# Patient Record
Sex: FEMALE | Race: WHITE | Employment: FULL TIME | URBAN - METROPOLITAN AREA
[De-identification: names, ages, dates, MRNs, and addresses within clinical notes are randomized per-mention and may not be internally consistent; named-entity substitution may affect disease eponyms.]

---

## 2018-07-05 ENCOUNTER — HOSPITAL ENCOUNTER (OUTPATIENT)
Dept: RADIATION THERAPY | Age: 53
End: 2018-07-05

## 2018-07-19 ENCOUNTER — HOSPITAL ENCOUNTER (OUTPATIENT)
Dept: RADIATION THERAPY | Age: 53
Discharge: HOME | End: 2018-07-19
Payer: COMMERCIAL

## 2018-07-19 PROCEDURE — 99211 OFF/OP EST MAY X REQ PHY/QHP: CPT

## 2018-08-30 ENCOUNTER — HOSPITAL ENCOUNTER (OUTPATIENT)
Dept: MAMMOGRAPHY | Age: 53
Discharge: HOME | End: 2018-08-30
Attending: RADIOLOGY
Payer: COMMERCIAL

## 2018-08-30 DIAGNOSIS — C50.412 MALIGNANT NEOPLASM OF UPPER-OUTER QUADRANT OF LEFT FEMALE BREAST (HCC): ICD-10-CM

## 2018-08-30 PROCEDURE — 77061 BREAST TOMOSYNTHESIS UNI: CPT

## 2021-07-13 NOTE — H&P (VIEW-ONLY)
Breast Cancer Consult      Ms. Mandy Brannon is a 60year old woman who was referred for recurrent left ER/MA positive, HER negative breast cancer, s/p core biopsy 6/9/21 with a personal history of left Stage IIA ([id number]) ER/MA positive, HER negative breast cancer s/p partial mastectomy 3/23/18 by Dr. Leticia Tan. Initial cancer Oncotype was 17. Patient declined radiation. She has been taking Femara since 12/18 per Dr. Maribell Roblero. The area of concern was identified on diagnostic imaging obtained for breast pain. She denied palpable mass. She denied nipple discharge. There is family history of breast cancer in her mother and grandmother and maternal cousin. She reports she has not had genetic testing. She is of Ashkenazi descent. Her most recent previous mammogram was 1/14/21. Breast/GYN history:    OB History    No obstetric history on file. Past Medical History:   Diagnosis Date    [employer] ancestry requiring population-specific genetic screening     Breast cancer [facility]     Left breast    Grand mal seizure ([facility] [address] [facility]     Seizures ([facility] [address] [facility]        Past Surgical History:   Procedure Laterality Date    HX BREAST LUMPECTOMY      Left breast       Current Outpatient Medications on File Prior to Visit   Medication Sig Dispense Refill    ergocalciferol (ERGOCALCIFEROL) 1,250 mcg (50,000 unit) capsule Take 50,000 Units by mouth.  letrozole (FEMARA) 2.5 mg tablet Take 2.5 mg by mouth daily.  phenytoin ER (DILANTIN ER) 100 mg ER capsule Take 200 mg by mouth two (2) times a day. No current facility-administered medications on file prior to visit.        Allergies   Allergen Reactions    Ampicillin Hives    Azithromycin Hives    Cephalexin Hives    Ciprofloxacin Hives    Sulfamethoxazole-Trimethoprim Other (comments) and Itching       Social History     Tobacco Use    Smoking status: Never Smoker    Smokeless tobacco: Never Used   Substance Use Topics    Alcohol use: Not on file    Drug use: Not on file       Family History   Problem Relation Age of Onset    Breast Cancer Mother 58    Breast Cancer Maternal Grandmother     Breast Cancer Maternal Cousin          ROS: positives are bolded  General: fevers, chills, night sweats, fatigue, weight loss, weight gain  GI: nausea, vomiting, abdominal pain, change in bowel habits, hematochezia, melena, GERD  Integ: dermatitis, abnormal moles  HEENT: visual changes, vertigo, epistaxis, dysphagia, hoarseness  Cardiac: chest pain, palpitations, HTN, edema, varicosities  Resp: cough, shortness of breath, wheezing, hemoptysis, snoring, reactive airway disease  : hematuria, dysuria, frequency, urgency, nocturia, stress urinary incontinence   MSK: weakness, joint pain, arthritis  Endocrine:  thyroid disease, polyuria, polydipsia, polyphagia, poor wound healing, heat intolerance, cold intolerance  Lymph/Heme: anemia, bruising, history of blood transfusions  Neuro: dizziness, headache, fainting, seizures, stroke  Psych: anxiety, depression    Physical Exam:  Visit Vitals  /82   Temp 97.2 °F (36.2 °C) (Skin)   Resp 18   Ht 5' 1\" (1.549 m)   Wt 76.2 kg (168 lb)   BMI 31.74 kg/m²       Gen:  No distress  Head: normocephalic, atraumatic  Mouth: Clear, no overt lesions, oral mucosa pink and moist  Neck: supple, no masses, no adenopathy, trachea midline  Resp: clear bilaterally  Cardio: Regular rate and rhythm  Abdomen: soft, nontender, nondistended  Extremities: warm, well-perfused  Neuro: sensation and strength grossly intact and symmetrical  Psych: alert and oriented to person, place and time  Breasts:   Right: Examined in both the supine and upright positions. There was no supraclavicular, infraclavicular, or axillary lymphadenopathy. There were no dominant masses, no skin changes, no asymmetry identified   Left: Examined in both the supine and upright positions. There was no supraclavicular, infraclavicular, or axillary lymphadenopathy.    There were no dominant masses, no skin changes, no asymmetry identified scar upper outer with nodularity ?hematoma vs mass vs scar tissue      Imagin21 left mammogram/ultrasound [facility])  Left 2:00 breast mass. Recommend ultrasound-guided core needle biopsy for further evaluation. Exam results were discussed with the patient. The referring clinician's office will be notified regarding the recommendations for left breast biopsy. Assessment Category 4: Suspicious     Signed By: Malick Fitch MD on 2021 2:31 PM    21 bilateral mammogram [facility])  No mammographic evidence of malignancy. Recommend annual screening mammography and clinical breast examination. A result letter will be sent to the patient. The patient will be notified by the [facility] reminder system for scheduling of her annual screening mammogram.     Assessment Category 2: Benign     Signed By: Candelaria Anaya MD on 2021 5:03 PM    Pathology:  21  A) LEFT BREAST MASS, CORE BIOPSY:       - INVASIVE CARCINOMA OF NOT SPECIAL TYPE (DUCTAL, NOS). - HISTOLOGIC stGstRstAstDstEst:st st1st OF 3.       - LARGEST FOCUS IN BIOPSY:  1.5 MM (MULTIPLE FRAGMENTS POSITIVE). - ANCILLARY STUDIES:          - ESTROGEN RECEPTOR: POSITIVE (90% CELS WITH MODERATE NUCLEAR STAINING)         - PROGESTERONE RECEPTOR: POSITIVE (10% CELLS WITH MODERATE NUCLEAR STAINING)         - HER-2: EQUIVOCAL (2+). FISH TEST RESULT TO FOLLOW IN A SEPARATE REPORT       Impression:  Patient Active Problem List   Diagnosis Code    Malignant neoplasm of upper-outer quadrant of left breast in female, estrogen receptor positive [facility] C50.412, Z17.0         60year old woman who was referred for recurrent left ER/CA positive, HER negative breast cancer, s/p core biopsy 21 with a personal history of left Stage IIA ([id number]) ER/CA positive, HER negative breast cancer s/p partial mastectomy 3/23/18 by Dr. Marisol Blanca.         We discussed that there are many options for treatment of breast cancer. Surgery, chemotherapy, radiation and hormone therapy are all tools that may be used in the treatment of breast cancer. For each patient, we determine which will be most beneficial based on her individual set of circumstances. For some patients all four treatment categories will be recommended. For others one or more of these options are not appropriate. We began by reviewing her imaging thus far as well as pathology in detail. Chemotherapy was addressed first.  Chemotherapy is systemic treatment aimed largely to decrease chance of spread or recurrence of cancer. It is administered by a medical oncologist.  Often the decision for chemotherapy is made after final pathology. I have recommended referral to medical oncology for additional information regarding chemotherapy. Regarding surgery, there are two main options, lumpectomy or mastectomy. We discussed both in detail. Overall survival and distant recurrence rates are the same. The decision is generally a personal decision more so than a medical one. Lumpectomy is also known as breast conservation surgery or partial mastectomy. The goal is to remove the area of concern as well as surrounding area of uninvolved tissue (\"clear margins\"). Radiation is almost always recommended with lumpectomy to allow for acceptable local recurrence rates. Local recurrence rates are approximately 6% after lumpectomy with radiation. Risks, benefits and options were discussed in detail to include, but not limited to, bleeding, infection, risks of anesthesia, injury to surrounding structures and other unforeseen events such as stroke, heart attack or death. Mastectomy was then addressed. With mastectomy, almost all of the breast tissue is removed. We are not able to remove 100% of the breast tissue. The risk of local recurrence is approximately 2-4% after mastectomy. The overlying skin is generally numb.   Most often, the numbness is permanent. Mastectomy can be performed with or without reconstruction. The reconstruction is performed by the plastic surgeon. Commonly it is a multi-step process with placement of tissue expanders as the first step. If she is interested in reconstruction, I will refer her to plastic surgery. Often we are able to perform a nipple sparing mastectomy with reconstruction. The reconstructed breast differs in many ways from the native breast.  The goal is that in a bra or clothing, no one can tell she has had a mastectomy. Radiation generally is not needed after mastectomy. There are some circumstances, usually based on size, margins, local extension or lymph node status, where post-mastectomy radiation is recommended. Risks, benefits and options were discussed in detail to include, but not limited to, bleeding, infection, risks of anesthesia, injury to surrounding structures and other unforeseen events such as stroke, heart attack or death. Routine screening mammogram is not recommended after mastectomy. As she is clinically node negative, she is a candidate for sentinel node biopsy. We discussed this procedure is a targeted sampling of the axillary lymph nodes to allow for staging of her disease. She will be injected with radioactive isotope as well as blue dye to allow for mapping and removal of the sentinel nodes. By removing only the sentinel nodes and not performing axillary node dissection, she has a decreased risk of lymphedema (arm swelling) and nerve injury. We did specifically discuss that both of these risks still exist.   Risks, benefits and options were discussed in detail to include, but not limited to, bleeding, infection, risks of anesthesia, injury to surrounding structures and other unforeseen events such as stroke, heart attack or death. If a significant amount of cancer is noted in her lymph nodes, an axillary lymph node dissection may be recommended.   In this procedure more, but not all, of the lymph nodes under the arm are removed. The chance of both lymphedema and nerve injury are increased with axillary node dissection compared to sentinel node biopsy. I generally recommend referral to physical therapy and a lymphedema specialist if an axillary node dissection is performed. We discussed radiation. Radiation is a local therapy aimed to decrease the chance of local recurrence. It is administered under the direction of a radiation oncologist.  Radiation is almost always recommended with lumpectomy. It generally is not needed after mastectomy. There are some circumstances, usually based on size, margins, local extension or lymph node status, where post-mastectomy radiation is recommended. Most commonly it is administered five days a week for up to seven weeks. Most of the side effects, with the exception of fatigue, are local.      Anti-hormone or hormone blocking therapy is used in hormone sensitive, estrogen receptor positive breast cancer. It is generally recommended for 5-10 years. There are two categories of hormone blocking medications. Tamoxifen is a selective estrogen reuptake modulator (SERM). There are several aromatase inhibitors. These medications are generally prescribed by a medical oncologist.     We discussed genetic testing prior to proceeding with surgery. We discussed genetic testing in detail. Health insurance should not be significantly affected by the results, though life and disability insurance may be. Often these would be affected by a breast cancer diagnosis, but it may be more difficult to obtain with a known personal genetic mutation. Those pursuing genetic testing may consider obtaining or maximizing benefits prior to proceeding with genetic testing. If she is discovered to harbor a germline mutation, bilateral mastectomy with or without reconstruction will be recommended as her operation.       We discussed that with recurrent breast cancer after lumpectomy, mastectomy is generally recommended. Ms. Danny Hirsch wants to avoid mastectomy. As she did not have radiation with her initial cancer, it is reasonable to perform repeat lumpectomy if she agrees to radiation. After discussing the above, Ms. Ruben Cota prefers left partial mastectomy with localization and sentinel node biopsy. We will schedule radiation consultation. All questions were answered. She was asked to call with any additional questions or concerns.

## 2021-07-13 NOTE — PROGRESS NOTES
Breast Cancer Consult      Ms. Maude Ching is a 60year old woman who was referred for recurrent left ER/SC positive, HER negative breast cancer, s/p core biopsy 6/9/21 with a personal history of left Stage IIA ([id number]) ER/SC positive, HER negative breast cancer s/p partial mastectomy 3/23/18 by Dr. Radha Jane. Initial cancer Oncotype was 17. Patient declined radiation. She has been taking Femara since 12/18 per Dr. Noy Ariza. The area of concern was identified on diagnostic imaging obtained for breast pain. She denied palpable mass. She denied nipple discharge. There is family history of breast cancer in her mother and grandmother and maternal cousin. She reports she has not had genetic testing. She is of Ashkenazi descent. Her most recent previous mammogram was 1/14/21. Breast/GYN history:    OB History    No obstetric history on file. Past Medical History:   Diagnosis Date    Ashkenazi [employer] ancestry requiring population-specific genetic screening     [facility] ([facility]     Left breast    Grand mal seizure ([facility]     Seizures ([facility]        Past Surgical History:   Procedure Laterality Date    HX BREAST LUMPECTOMY      Left breast       Current Outpatient Medications on File Prior to Visit   Medication Sig Dispense Refill    ergocalciferol (ERGOCALCIFEROL) 1,250 mcg (50,000 unit) capsule Take 50,000 Units by mouth.  letrozole (FEMARA) 2.5 mg tablet Take 2.5 mg by mouth daily.  phenytoin ER (DILANTIN ER) 100 mg ER capsule Take 200 mg by mouth two (2) times a day. No current facility-administered medications on file prior to visit.        Allergies   Allergen Reactions    Ampicillin Hives    Azithromycin Hives    Cephalexin Hives    Ciprofloxacin Hives    Sulfamethoxazole-Trimethoprim Other (comments) and Itching       Social History     Tobacco Use    Smoking status: Never Smoker    Smokeless tobacco: Never Used   Substance Use Topics    Alcohol use: Not on file    Drug use: Not on file       Family History   Problem Relation Age of Onset    Breast Cancer Mother 58    Breast Cancer Maternal Grandmother     Breast Cancer Maternal Cousin          ROS: positives are bolded  General: fevers, chills, night sweats, fatigue, weight loss, weight gain  GI: nausea, vomiting, abdominal pain, change in bowel habits, hematochezia, melena, GERD  Integ: dermatitis, abnormal moles  HEENT: visual changes, vertigo, epistaxis, dysphagia, hoarseness  Cardiac: chest pain, palpitations, HTN, edema, varicosities  Resp: cough, shortness of breath, wheezing, hemoptysis, snoring, reactive airway disease  : hematuria, dysuria, frequency, urgency, nocturia, stress urinary incontinence   MSK: weakness, joint pain, arthritis  Endocrine:  thyroid disease, polyuria, polydipsia, polyphagia, poor wound healing, heat intolerance, cold intolerance  Lymph/Heme: anemia, bruising, history of blood transfusions  Neuro: dizziness, headache, fainting, seizures, stroke  Psych: anxiety, depression    Physical Exam:  Visit Vitals  /82   Temp 97.2 °F (36.2 °C) (Skin)   Resp 18   Ht 5' 1\" (1.549 m)   Wt 76.2 kg (168 lb)   BMI 31.74 kg/m²       Gen:  No distress  Head: normocephalic, atraumatic  Mouth: Clear, no overt lesions, oral mucosa pink and moist  Neck: supple, no masses, no adenopathy, trachea midline  Resp: clear bilaterally  Cardio: Regular rate and rhythm  Abdomen: soft, nontender, nondistended  Extremities: warm, well-perfused  Neuro: sensation and strength grossly intact and symmetrical  Psych: alert and oriented to person, place and time  Breasts:   Right: Examined in both the supine and upright positions. There was no supraclavicular, infraclavicular, or axillary lymphadenopathy. There were no dominant masses, no skin changes, no asymmetry identified   Left: Examined in both the supine and upright positions. There was no supraclavicular, infraclavicular, or axillary lymphadenopathy.    There were no dominant masses, no skin changes, no asymmetry identified scar upper outer with nodularity ?hematoma vs mass vs scar tissue      Imagin21 left mammogram/ultrasound [facility])  Left 2:00 breast mass. Recommend ultrasound-guided core needle biopsy for further evaluation. Exam results were discussed with the patient. The referring clinician's office will be notified regarding the recommendations for left breast biopsy. Assessment Category 4: Suspicious     Signed By: Naga Barbosa MD on 2021 2:31 PM    21 bilateral mammogram [facility])  No mammographic evidence of malignancy. Recommend annual screening mammography and clinical breast examination. A result letter will be sent to the patient. The patient will be notified by the [facility] reminder system for scheduling of her annual screening mammogram.     Assessment Category 2: Benign     Signed By: Emily Singer MD on 2021 5:03 PM    Pathology:  21  A) LEFT BREAST MASS, CORE BIOPSY:       - INVASIVE CARCINOMA OF NOT SPECIAL TYPE (DUCTAL, NOS). - HISTOLOGIC stGstRstAstDstEst:st st1st OF 3.       - LARGEST FOCUS IN BIOPSY:  1.5 MM (MULTIPLE FRAGMENTS POSITIVE). - ANCILLARY STUDIES:          - ESTROGEN RECEPTOR: POSITIVE (90% CELS WITH MODERATE NUCLEAR STAINING)         - PROGESTERONE RECEPTOR: POSITIVE (10% CELLS WITH MODERATE NUCLEAR STAINING)         - HER-2: EQUIVOCAL (2+). FISH TEST RESULT TO FOLLOW IN A SEPARATE REPORT       Impression:  Patient Active Problem List   Diagnosis Code    Malignant neoplasm of upper-outer quadrant of left breast in female, estrogen receptor positive ([facility] C50.412, Z17.0         60year old woman who was referred for recurrent left ER/MD positive, HER negative breast cancer, s/p core biopsy 21 with a personal history of left Stage IIA ([id number]) ER/MD positive, HER negative breast cancer s/p partial mastectomy 3/23/18 by Dr. Zelalem Valentine.         We discussed that there are many options for treatment of breast cancer. Surgery, chemotherapy, radiation and hormone therapy are all tools that may be used in the treatment of breast cancer. For each patient, we determine which will be most beneficial based on her individual set of circumstances. For some patients all four treatment categories will be recommended. For others one or more of these options are not appropriate. We began by reviewing her imaging thus far as well as pathology in detail. Chemotherapy was addressed first.  Chemotherapy is systemic treatment aimed largely to decrease chance of spread or recurrence of cancer. It is administered by a medical oncologist.  Often the decision for chemotherapy is made after final pathology. I have recommended referral to medical oncology for additional information regarding chemotherapy. Regarding surgery, there are two main options, lumpectomy or mastectomy. We discussed both in detail. Overall survival and distant recurrence rates are the same. The decision is generally a personal decision more so than a medical one. Lumpectomy is also known as breast conservation surgery or partial mastectomy. The goal is to remove the area of concern as well as surrounding area of uninvolved tissue (\"clear margins\"). Radiation is almost always recommended with lumpectomy to allow for acceptable local recurrence rates. Local recurrence rates are approximately 6% after lumpectomy with radiation. Risks, benefits and options were discussed in detail to include, but not limited to, bleeding, infection, risks of anesthesia, injury to surrounding structures and other unforeseen events such as stroke, heart attack or death. Mastectomy was then addressed. With mastectomy, almost all of the breast tissue is removed. We are not able to remove 100% of the breast tissue. The risk of local recurrence is approximately 2-4% after mastectomy. The overlying skin is generally numb.   Most often, the numbness is permanent. Mastectomy can be performed with or without reconstruction. The reconstruction is performed by the plastic surgeon. Commonly it is a multi-step process with placement of tissue expanders as the first step. If she is interested in reconstruction, I will refer her to plastic surgery. Often we are able to perform a nipple sparing mastectomy with reconstruction. The reconstructed breast differs in many ways from the native breast.  The goal is that in a bra or clothing, no one can tell she has had a mastectomy. Radiation generally is not needed after mastectomy. There are some circumstances, usually based on size, margins, local extension or lymph node status, where post-mastectomy radiation is recommended. Risks, benefits and options were discussed in detail to include, but not limited to, bleeding, infection, risks of anesthesia, injury to surrounding structures and other unforeseen events such as stroke, heart attack or death. Routine screening mammogram is not recommended after mastectomy. As she is clinically node negative, she is a candidate for sentinel node biopsy. We discussed this procedure is a targeted sampling of the axillary lymph nodes to allow for staging of her disease. She will be injected with radioactive isotope as well as blue dye to allow for mapping and removal of the sentinel nodes. By removing only the sentinel nodes and not performing axillary node dissection, she has a decreased risk of lymphedema (arm swelling) and nerve injury. We did specifically discuss that both of these risks still exist.   Risks, benefits and options were discussed in detail to include, but not limited to, bleeding, infection, risks of anesthesia, injury to surrounding structures and other unforeseen events such as stroke, heart attack or death. If a significant amount of cancer is noted in her lymph nodes, an axillary lymph node dissection may be recommended.   In this procedure more, but not all, of the lymph nodes under the arm are removed. The chance of both lymphedema and nerve injury are increased with axillary node dissection compared to sentinel node biopsy. I generally recommend referral to physical therapy and a lymphedema specialist if an axillary node dissection is performed. We discussed radiation. Radiation is a local therapy aimed to decrease the chance of local recurrence. It is administered under the direction of a radiation oncologist.  Radiation is almost always recommended with lumpectomy. It generally is not needed after mastectomy. There are some circumstances, usually based on size, margins, local extension or lymph node status, where post-mastectomy radiation is recommended. Most commonly it is administered five days a week for up to seven weeks. Most of the side effects, with the exception of fatigue, are local.      Anti-hormone or hormone blocking therapy is used in hormone sensitive, estrogen receptor positive breast cancer. It is generally recommended for 5-10 years. There are two categories of hormone blocking medications. Tamoxifen is a selective estrogen reuptake modulator (SERM). There are several aromatase inhibitors. These medications are generally prescribed by a medical oncologist.     We discussed genetic testing prior to proceeding with surgery. We discussed genetic testing in detail. Health insurance should not be significantly affected by the results, though life and disability insurance may be. Often these would be affected by a breast cancer diagnosis, but it may be more difficult to obtain with a known personal genetic mutation. Those pursuing genetic testing may consider obtaining or maximizing benefits prior to proceeding with genetic testing. If she is discovered to harbor a germline mutation, bilateral mastectomy with or without reconstruction will be recommended as her operation.       We discussed that with recurrent breast cancer after lumpectomy, mastectomy is generally recommended. Ms. Xavier Monroe wants to avoid mastectomy. As she did not have radiation with her initial cancer, it is reasonable to perform repeat lumpectomy if she agrees to radiation. After discussing the above, Ms. Antonio Fernandez prefers left partial mastectomy with localization and sentinel node biopsy. We will schedule radiation consultation. All questions were answered. She was asked to call with any additional questions or concerns.

## 2021-07-14 ENCOUNTER — NURSE NAVIGATOR (OUTPATIENT)
Dept: SURGERY | Age: 56
End: 2021-07-14

## 2021-07-14 ENCOUNTER — OFFICE VISIT (OUTPATIENT)
Dept: SURGERY | Age: 56
End: 2021-07-14
Payer: COMMERCIAL

## 2021-07-14 VITALS
BODY MASS INDEX: 31.72 KG/M2 | DIASTOLIC BLOOD PRESSURE: 82 MMHG | WEIGHT: 168 LBS | HEIGHT: 61 IN | SYSTOLIC BLOOD PRESSURE: 132 MMHG | TEMPERATURE: 97.2 F | RESPIRATION RATE: 18 BRPM

## 2021-07-14 DIAGNOSIS — C50.412 MALIGNANT NEOPLASM OF UPPER-OUTER QUADRANT OF LEFT BREAST IN FEMALE, ESTROGEN RECEPTOR POSITIVE (HCC): Primary | ICD-10-CM

## 2021-07-14 DIAGNOSIS — Z17.0 MALIGNANT NEOPLASM OF UPPER-OUTER QUADRANT OF LEFT BREAST IN FEMALE, ESTROGEN RECEPTOR POSITIVE (HCC): Primary | ICD-10-CM

## 2021-07-14 PROCEDURE — 99205 OFFICE O/P NEW HI 60 MIN: CPT | Performed by: SURGERY

## 2021-07-14 RX ORDER — LETROZOLE 2.5 MG/1
2.5 TABLET, FILM COATED ORAL DAILY
COMMUNITY
Start: 2021-05-04

## 2021-07-14 RX ORDER — ERGOCALCIFEROL 1.25 MG/1
50000 CAPSULE ORAL
COMMUNITY
Start: 2021-05-04

## 2021-07-14 RX ORDER — PHENYTOIN SODIUM 100 MG/1
200 CAPSULE, EXTENDED RELEASE ORAL 2 TIMES DAILY
COMMUNITY
Start: 2021-03-01 | End: 2022-09-16

## 2021-07-14 NOTE — PROGRESS NOTES
Initial assessment for Simba Farrell, newly diagnosed with a recurrence of Invasive Ductal Carcinoma. Ms Celsa Carreon had a partial mastectomy in 2018 but refused radiation treatment. SHe is a current patient of Dr. Stefanie Moore. Ms. Celsa Carreon has a history of epilepsy. Meeting with pt included pt and her spouse. She reports feeling anxious and is tearful. Educatoin provided concerning radiation treatment. She is being referred to Dr Pawel Hernandez for radiation oncology. Saliva sample collected for genetic testing. Patient was assessed for the following barriers:    Communication:       Yes    No  -Ability to read/write,understand [place]       [x]      []    -Ability to talk with family/children,friends about diagnosis     [x]      []    -Other:  Comments/Referrals/Services provided: NA  Employment/Financial/Legal:     Yes    No  -Loss of employment/income         []      [x]    -Insurance coverage          [x]      []     -Needs help applying for Social Security/Disability/FMLA     []      [x]     -Help with Co-Pays for office visits         []      [x]    -Medication assistance/medical supplies or equipment     []      [x]    -Difficulty paying bills: utilities/housing       []      [x]    -Means to buy food                     [x]      []    -Legal issues           []      [x]    -Other:  Comments/Referrals/Services provided: NA  Psychosocial        Yes    No  Housing:  -Homeless           []      [x]    -Extended care needs: long term care/home care/Hospice     []      [x]    -Other:  Comments/Referrals/Services provided: NA  Transportation:       Yes    No  -Lack of vehicle or public transportation options      []      [x]    -Funds need for public transportation: bus/taxi      []      [x]    -Other:  Comments/Referrals/Services provided: NA  Support system:       Yes No  -Family members at home: spouse/significant other/children    [x]      []    -Has a support system         [x]      []    Other:  Comments/Referrals/Services provided: NA  Spirituality:        Yes No  -Spiritual issues          []      [x]    -Cultural concerns          []      [x]    -Other:  -Comments/Referrals/Services provided: NA  Sexuality:        Yes No  -Body image concerns                     []      [x]    -Relationships/significant other issues        []      [x]    -Other:  Comments/Referrals/Services provided: NA   Coping:        Yes    No  -Able to manage emotions         []      [x]    -Feeling fearful or anxious         [x]      []    -Interest in attending support groups        [x]      []    -Cancer related pain/control         []      [x]    -Other:  Comments/Referrals/Services provided:  Referral to . Tobacco dependency:      Yes No  -Currently smokes: cigarettes/cigars        []      [x]    -Uses smokeless tobacco         []      [x]    -Other:  Comments/Referrals/Services provided: NA    Education/review of Disease process and Management     Yes No  -Explanation of navigator role/contact information      [x]      []    New Patient Guide for Breast Cancer provided                      [x]     []         -Pathology/Staging          []      [x]    -Diagnostic tests          []      [x]    -Genetic testing needed         [x]      []    -Treatment options/plan: surgery/chemotherapy/radiation     []      [x]    -Mediport placement as needed                              []      [x]    -Tobacco cessation as needed        []      [x]    -Need for a second opinion         []      [x]    -Importance of bringing family/friends to medical appts     []      [x]   -Other:  Patient/family verbalized understanding of treatment plan: Yes. NCCN Distress Tool    Sherrilee Kanner  was assessed for management of distress using the NCCN Distress Thermometer for Patients tool. Ms. Jose Wright rates her distress level a 5 on a scale of 0-10.     Mild to moderate distress  Scorin-4        Yes    No    -Practical/physical issues       [x]      [] -Provide community resources      [x]      []      -Provide list of support groups      [x]      []      -Provide list of national organizations and websites               [x]      []      -Provide ongoing supportive care by oncology medical team        [x]      []                  Comments/Referrals/Services provided:  Yes. Moderate to severe distress  Scorin or greater                   Yes    No    -Navigator consulted with MD      [x]      []     -Navigator follow up         [x]      []     -Spiritual concerns        []      [x]     -Emotional/family concerns       []      [x]     -Refer to /mental health professional/PCP   [x]      []      Comments/Referral/Services provided:  Yes.

## 2021-07-14 NOTE — PROGRESS NOTES
Patient presents as referred for recurrent left ER/VT positive, HER negative breast cancer, s/p core biopsy 6/9/21

## 2021-07-14 NOTE — LETTER
7/14/2021 1:10 PM    Patient:  Cody Leigh   YOB: 1965  Date of Visit: 7/14/2021      Cheri Yuan MD  [address] [place] [address]  [address]  Via Fax: [phone]     Sally Triana MD  [address] [facility] [address]  [address] [address]  Via Fax: [phone]    Dear MD Sally Arias MD,      I had the pleasure of seeing Ms. Cody Leigh in my office today for her breast cancer. I am including a copy of my office visit today. If you have questions, please do not hesitate to call me. I look forward to following Ms. Stanton along with you and will keep you updated as to her progress.            Sincerely,      Saji Sood MD

## 2021-07-16 ENCOUNTER — TELEPHONE (OUTPATIENT)
Dept: SURGERY | Age: 56
End: 2021-07-16

## 2021-07-16 DIAGNOSIS — C50.412 MALIGNANT NEOPLASM OF UPPER-OUTER QUADRANT OF LEFT BREAST IN FEMALE, ESTROGEN RECEPTOR POSITIVE (HCC): Primary | ICD-10-CM

## 2021-07-16 DIAGNOSIS — Z17.0 MALIGNANT NEOPLASM OF UPPER-OUTER QUADRANT OF LEFT BREAST IN FEMALE, ESTROGEN RECEPTOR POSITIVE (HCC): Primary | ICD-10-CM

## 2021-07-16 NOTE — TELEPHONE ENCOUNTER
Spoke to Mrs. Stanton re: schedule surgery (left partial mastectomy with loc/SNB) with Dr. Anthony Patino faxed to Dr. Laith Carpenter office/to expect a phone call from Dr. Laith Carpenter office to schedule consult/tag placement. Mrs. Juan Hoff states she's driving on her way to work and ask that we please call her , Nilton Hodges, to schedule all of her appointments at [phone]. Mrs. Juan Hoff states although genetic test result is pending her preference is to have the left partial mastectomy and states she will not have a total mastectomy because of her Hindu background. Informed I'll contact the breast center to request an appointment for the tag placement and I'll contact her  to schedule all appointments as requested.

## 2021-07-19 ENCOUNTER — DOCUMENTATION ONLY (OUTPATIENT)
Age: 56
End: 2021-07-19

## 2021-07-19 NOTE — PROGRESS NOTES
Oncology Social Work Note   Patient name: Michael Shaw   MRN: [id number]   YOB: [birth year]    MSW received referral from nurse navigator with request to contact patient to assess for needs. MSW attempted to reach patient by phone, no answer to call-VM full. MSW unable to leave a message for patient. MSW will continue to attempt to reach patient to assess for needs. MSW will provide support, information and assistance as needed and/or requested. Julissa Briggs, MSW, LMSW

## 2021-07-20 ENCOUNTER — TELEPHONE (OUTPATIENT)
Dept: SURGERY | Age: 56
End: 2021-07-20

## 2021-07-20 NOTE — TELEPHONE ENCOUNTER
Spoke to Mrs. Stanton re: tag placement appt at [facility] breast center/scheduled for Wednesday, August 4, 2021 at 8:00am.  Mrs. Angela Carrasco states she want to confirm she's not going to have a mastectomy and will wake up after surgery with her breast.  I told Mrs. Angela Carrasco we're aware of her wish to have the partial mastectomy and such is documented in her chart/explained she'll also have the option to discuss any further questions with Dr. Asaf Crawford the day of surgery. Mrs. Angela Carrasco states she has a strong family history of breast cancer and she do not want a mastectomy. Additionally Mrs. Anegla Carrasco states her neurologist Dr. Quezada Purple want to know what anti-estrogen will she be prescribed so it wont' interfere with her medications/oncology will advise. I told Mrs. Stanton I would relay her message to Dr. Asaf Crawford.

## 2021-07-26 ENCOUNTER — NURSE NAVIGATOR (OUTPATIENT)
Dept: SURGERY | Age: 56
End: 2021-07-26

## 2021-07-30 ENCOUNTER — TELEPHONE (OUTPATIENT)
Dept: SURGERY | Age: 56
End: 2021-07-30

## 2021-07-30 NOTE — TELEPHONE ENCOUNTER
Called Trang Asencio to notify her that results of genetic testing from [facility] indicated a negative results. Patient verbalized understanding.

## 2021-07-30 NOTE — TELEPHONE ENCOUNTER
Mrs. Alma Rosa Aj called inquring about surgery time/tag placement/nutritional product - patriot greens. I reviewed surgery date/time and informed Mrs. Alma Rosa Aj I will call her on Monday, August 2, 2021 with further information regarding tag placement re: radiologist need to consult with Dr. Xiomara eD. Mrs. Alma Rosa Aj states she want to know if Dr. Xiomara De would recommend patriot greens and want to know if this product would interfere with her medications/whether it's a good product to take due to the sugar content. I told Mrs. Stanton she should discuss any nutritional supplements/products she's taking with her medical oncologist.  Mrs. Alma Rosa Aj stated Dr. Xiomara De is also going to remove additional breast tissue. I explained planned surgery is left partial mastectomy with localization and sentinel lymph node biopsy. I told Mrs. Stanton after the tag appointment in confirmed, I'll email her an itinerary/surgery instructions. Mrs. Stanton request I forward email to [email or web address].

## 2021-08-02 NOTE — PERIOP NOTES
PRE-SURGICAL INSTRUCTIONS        Patient's Name:  Coleman GOODMAN Date:  8/2/2021              Surgery Date:  8/10/2021                1. Do NOT eat or drink anything, including candy, gum, or ice chips after midnight on 8/9/2021, unless you have specific instructions from your surgeon or anesthesia provider to do so.  2. You may brush your teeth before coming to the hospital.  3. No smoking 24 hours prior to the day of surgery. 4. No alcohol 24 hours prior to the day of surgery. 5. No recreational drugs for one week prior to the day of surgery. 6. Leave all valuables, including money/purse, at home. 7. Remove all jewelry, nail polish, acrylic nails, and makeup (including mascara); no lotions powders, deodorant, or perfume/cologne/after shave on the skin. 8. Glasses/contact lenses and dentures may be worn to the hospital.  They will be removed prior to surgery. 9. Call your doctor if symptoms of a cold or illness develop within 24-48 hours prior to your surgery. 10.  AN ADULT MUST DRIVE YOU HOME AFTER OUTPATIENT SURGERY. 11.  If you are having an outpatient procedure, please make arrangements for a responsible adult to be with you for 24 hours after your surgery. 12.  One visitor allowed for outpatient procedures . Exceptions may be made for surgical admissions, per nursing unit guidelines      Special Instructions:      Bring list of CURRENT medications. Bring COVID results. Bring any pertinent legal medical records. Take these medications the morning of surgery with a sip of water: Dilantin    On the day of surgery, come in the main entrance of [facility]. Let the  at the desk know you are there for surgery. A staff member will come escort you to the surgical area on the second floor.     If you have any questions or concerns, please do not hesitate to call:     (Prior to the day of surgery) [facility] department:  [phone]   (Day of surgery) Pre-Op department: [phone]    These surgical instructions were reviewed with patient during the PAT phone call.

## 2021-08-03 ENCOUNTER — DOCUMENTATION ONLY (OUTPATIENT)
Dept: SURGERY | Age: 56
End: 2021-08-03

## 2021-08-03 DIAGNOSIS — C50.412 MALIGNANT NEOPLASM OF UPPER-OUTER QUADRANT OF LEFT BREAST IN FEMALE, ESTROGEN RECEPTOR POSITIVE (HCC): Primary | ICD-10-CM

## 2021-08-03 DIAGNOSIS — Z17.0 MALIGNANT NEOPLASM OF UPPER-OUTER QUADRANT OF LEFT BREAST IN FEMALE, ESTROGEN RECEPTOR POSITIVE (HCC): Primary | ICD-10-CM

## 2021-08-03 NOTE — PROGRESS NOTES
Localization via drawing on skin will be performed at [facility] breast center on Monday, August 9, 2021 at [facility] at [address] [facility], [address] [facility], [facility].

## 2021-08-04 ENCOUNTER — TELEPHONE (OUTPATIENT)
Dept: SURGERY | Age: 56
End: 2021-08-04

## 2021-08-04 NOTE — TELEPHONE ENCOUNTER
Marvin Ashanti Hoff called requesting a letter she may forward to her trust fund stating her diagnosis/planned surgery/recovery time because she'll be out of work for a couple of weeks and want to make arrangements to have some income during her recovery.

## 2021-08-05 ENCOUNTER — TELEPHONE (OUTPATIENT)
Dept: SURGERY | Age: 56
End: 2021-08-05

## 2021-08-05 ENCOUNTER — HOSPITAL ENCOUNTER (OUTPATIENT)
Dept: RADIATION THERAPY | Age: 56
Discharge: HOME | End: 2021-08-05
Payer: COMMERCIAL

## 2021-08-05 PROCEDURE — 99211 OFF/OP EST MAY X REQ PHY/QHP: CPT

## 2021-08-05 NOTE — TELEPHONE ENCOUNTER
Spoke to Mrs. Stanton to inform of appointment, Monday, August 9, 2021 at 2pm at [facility] (loc-drawing)

## 2021-08-09 ENCOUNTER — TELEPHONE (OUTPATIENT)
Dept: SURGERY | Age: 56
End: 2021-08-09

## 2021-08-09 ENCOUNTER — ANESTHESIA EVENT (OUTPATIENT)
Dept: SURGERY | Age: 56
End: 2021-08-09
Payer: COMMERCIAL

## 2021-08-10 ENCOUNTER — APPOINTMENT (OUTPATIENT)
Dept: MAMMOGRAPHY | Age: 56
End: 2021-08-10
Attending: SURGERY
Payer: COMMERCIAL

## 2021-08-10 ENCOUNTER — APPOINTMENT (OUTPATIENT)
Dept: NUCLEAR MEDICINE | Age: 56
End: 2021-08-10
Attending: SURGERY
Payer: COMMERCIAL

## 2021-08-10 ENCOUNTER — HOSPITAL ENCOUNTER (OUTPATIENT)
Age: 56
Setting detail: OUTPATIENT SURGERY
Discharge: HOME | End: 2021-08-10
Attending: SURGERY | Admitting: SURGERY
Payer: COMMERCIAL

## 2021-08-10 ENCOUNTER — ANESTHESIA (OUTPATIENT)
Dept: SURGERY | Age: 56
End: 2021-08-10
Payer: COMMERCIAL

## 2021-08-10 VITALS
DIASTOLIC BLOOD PRESSURE: 70 MMHG | TEMPERATURE: 98.3 F | RESPIRATION RATE: 20 BRPM | HEIGHT: 61 IN | BODY MASS INDEX: 32.1 KG/M2 | OXYGEN SATURATION: 97 % | HEART RATE: 62 BPM | SYSTOLIC BLOOD PRESSURE: 110 MMHG | WEIGHT: 170 LBS

## 2021-08-10 DIAGNOSIS — C50.412 MALIGNANT NEOPLASM OF UPPER-OUTER QUADRANT OF LEFT BREAST IN FEMALE, ESTROGEN RECEPTOR POSITIVE (HCC): ICD-10-CM

## 2021-08-10 DIAGNOSIS — Z17.0 MALIGNANT NEOPLASM OF UPPER-OUTER QUADRANT OF LEFT BREAST IN FEMALE, ESTROGEN RECEPTOR POSITIVE (HCC): ICD-10-CM

## 2021-08-10 DIAGNOSIS — N63.0 LUMP OR MASS IN BREAST: ICD-10-CM

## 2021-08-10 DIAGNOSIS — R92.8 ABNORMAL MAMMOGRAM: ICD-10-CM

## 2021-08-10 DIAGNOSIS — C50.919 INVASIVE CARCINOMA OF BREAST (HCC): ICD-10-CM

## 2021-08-10 LAB
AMPHET UR QL SCN: NEGATIVE
ANION GAP SERPL CALC-SCNC: 5 MMOL/L (ref 3–18)
BARBITURATES UR QL SCN: NEGATIVE
BASOPHILS # BLD: 0 K/UL (ref 0–0.1)
BASOPHILS NFR BLD: 0 % (ref 0–2)
BENZODIAZ UR QL: NEGATIVE
BUN SERPL-MCNC: 21 MG/DL (ref 7–18)
BUN/CREAT SERPL: 36 (ref 12–20)
CALCIUM SERPL-MCNC: 9.3 MG/DL (ref 8.5–10.1)
CANNABINOIDS UR QL SCN: NEGATIVE
CHLORIDE SERPL-SCNC: 107 MMOL/L (ref 100–111)
CO2 SERPL-SCNC: 26 MMOL/L (ref 21–32)
COCAINE UR QL SCN: NEGATIVE
CREAT SERPL-MCNC: 0.59 MG/DL (ref 0.6–1.3)
DIFFERENTIAL METHOD BLD: ABNORMAL
EOSINOPHIL # BLD: 0.1 K/UL (ref 0–0.4)
EOSINOPHIL NFR BLD: 1 % (ref 0–5)
ERYTHROCYTE [DISTWIDTH] IN BLOOD BY AUTOMATED COUNT: 12.6 % (ref 11.6–14.5)
GLUCOSE SERPL-MCNC: 80 MG/DL (ref 74–99)
HCT VFR BLD AUTO: 37.6 % (ref 35–45)
HDSCOM,HDSCOM: NORMAL
HGB BLD-MCNC: 12.7 G/DL (ref 12–16)
LYMPHOCYTES # BLD: 1.6 K/UL (ref 0.9–3.6)
LYMPHOCYTES NFR BLD: 31 % (ref 21–52)
MCH RBC QN AUTO: 29.9 PG (ref 24–34)
MCHC RBC AUTO-ENTMCNC: 33.8 G/DL (ref 31–37)
MCV RBC AUTO: 88.5 FL (ref 74–97)
METHADONE UR QL: NEGATIVE
MONOCYTES # BLD: 0.5 K/UL (ref 0.05–1.2)
MONOCYTES NFR BLD: 10 % (ref 3–10)
NEUTS SEG # BLD: 2.9 K/UL (ref 1.8–8)
NEUTS SEG NFR BLD: 57 % (ref 40–73)
OPIATES UR QL: NEGATIVE
PCP UR QL: NEGATIVE
PLATELET # BLD AUTO: 187 K/UL (ref 135–420)
PMV BLD AUTO: 8.3 FL (ref 9.2–11.8)
POTASSIUM SERPL-SCNC: 3.7 MMOL/L (ref 3.5–5.5)
RBC # BLD AUTO: 4.25 M/UL (ref 4.2–5.3)
SODIUM SERPL-SCNC: 138 MMOL/L (ref 136–145)
WBC # BLD AUTO: 5.1 K/UL (ref 4.6–13.2)

## 2021-08-10 PROCEDURE — 74011250636 HC RX REV CODE- 250/636: Performed by: NURSE ANESTHETIST, CERTIFIED REGISTERED

## 2021-08-10 PROCEDURE — 77030010509 HC AIRWY LMA MSK TELE -A: Performed by: ANESTHESIOLOGY

## 2021-08-10 PROCEDURE — 74011000250 HC RX REV CODE- 250: Performed by: SURGERY

## 2021-08-10 PROCEDURE — 76010000153 HC OR TIME 1.5 TO 2 HR: Performed by: SURGERY

## 2021-08-10 PROCEDURE — 74011250637 HC RX REV CODE- 250/637: Performed by: NURSE ANESTHETIST, CERTIFIED REGISTERED

## 2021-08-10 PROCEDURE — 38900 IO MAP OF SENT LYMPH NODE: CPT | Performed by: SURGERY

## 2021-08-10 PROCEDURE — 88307 TISSUE EXAM BY PATHOLOGIST: CPT

## 2021-08-10 PROCEDURE — 77030040922 HC BLNKT HYPOTHRM STRY -A: Performed by: SURGERY

## 2021-08-10 PROCEDURE — 38525 BIOPSY/REMOVAL LYMPH NODES: CPT | Performed by: SURGERY

## 2021-08-10 PROCEDURE — 01610 ANES ALL PX NRV MUSC SHO&AX: CPT | Performed by: NURSE ANESTHETIST, CERTIFIED REGISTERED

## 2021-08-10 PROCEDURE — 76210000026 HC REC RM PH II 1 TO 1.5 HR: Performed by: SURGERY

## 2021-08-10 PROCEDURE — 77030002996 HC SUT SLK J&J -A: Performed by: SURGERY

## 2021-08-10 PROCEDURE — 01610 ANES ALL PX NRV MUSC SHO&AX: CPT | Performed by: ANESTHESIOLOGY

## 2021-08-10 PROCEDURE — 74011000250 HC RX REV CODE- 250

## 2021-08-10 PROCEDURE — 88305 TISSUE EXAM BY PATHOLOGIST: CPT

## 2021-08-10 PROCEDURE — 85025 COMPLETE CBC W/AUTO DIFF WBC: CPT

## 2021-08-10 PROCEDURE — 74011000258 HC RX REV CODE- 258: Performed by: SURGERY

## 2021-08-10 PROCEDURE — 76210000006 HC OR PH I REC 0.5 TO 1 HR: Performed by: SURGERY

## 2021-08-10 PROCEDURE — 80048 BASIC METABOLIC PNL TOTAL CA: CPT

## 2021-08-10 PROCEDURE — 74011000636 HC RX REV CODE- 636: Performed by: SURGERY

## 2021-08-10 PROCEDURE — 74011000250 HC RX REV CODE- 250: Performed by: NURSE ANESTHETIST, CERTIFIED REGISTERED

## 2021-08-10 PROCEDURE — 77030031139 HC SUT VCRL2 J&J -A: Performed by: SURGERY

## 2021-08-10 PROCEDURE — 76060000034 HC ANESTHESIA 1.5 TO 2 HR: Performed by: SURGERY

## 2021-08-10 PROCEDURE — 77030037400 HC ADH TISS HI VISC EXOFIN CHMP -B: Performed by: SURGERY

## 2021-08-10 PROCEDURE — 77030002933 HC SUT MCRYL J&J -A: Performed by: SURGERY

## 2021-08-10 PROCEDURE — 88309 TISSUE EXAM BY PATHOLOGIST: CPT

## 2021-08-10 PROCEDURE — 77030013079 HC BLNKT BAIR HGGR 3M -A: Performed by: ANESTHESIOLOGY

## 2021-08-10 PROCEDURE — 2709999900 HC NON-CHARGEABLE SUPPLY: Performed by: SURGERY

## 2021-08-10 PROCEDURE — 77030040361 HC SLV COMPR DVT MDII -B: Performed by: SURGERY

## 2021-08-10 PROCEDURE — 77030040201 HC PRB SET LOCALIZER DISP BIPT -D: Performed by: SURGERY

## 2021-08-10 PROCEDURE — 19301 PARTIAL MASTECTOMY: CPT | Performed by: SURGERY

## 2021-08-10 PROCEDURE — 80307 DRUG TEST PRSMV CHEM ANLYZR: CPT

## 2021-08-10 RX ORDER — SODIUM CHLORIDE, SODIUM LACTATE, POTASSIUM CHLORIDE, CALCIUM CHLORIDE 600; 310; 30; 20 MG/100ML; MG/100ML; MG/100ML; MG/100ML
50 INJECTION, SOLUTION INTRAVENOUS CONTINUOUS
Status: DISCONTINUED | OUTPATIENT
Start: 2021-08-10 | End: 2021-08-10 | Stop reason: HOSPADM

## 2021-08-10 RX ORDER — SODIUM CHLORIDE 0.9 % (FLUSH) 0.9 %
5-40 SYRINGE (ML) INJECTION EVERY 8 HOURS
Status: DISCONTINUED | OUTPATIENT
Start: 2021-08-10 | End: 2021-08-10 | Stop reason: HOSPADM

## 2021-08-10 RX ORDER — BUPIVACAINE HYDROCHLORIDE AND EPINEPHRINE 5; 5 MG/ML; UG/ML
INJECTION, SOLUTION EPIDURAL; INTRACAUDAL; PERINEURAL AS NEEDED
Status: DISCONTINUED | OUTPATIENT
Start: 2021-08-10 | End: 2021-08-10 | Stop reason: HOSPADM

## 2021-08-10 RX ORDER — ONDANSETRON 2 MG/ML
4 INJECTION INTRAMUSCULAR; INTRAVENOUS ONCE
Status: DISCONTINUED | OUTPATIENT
Start: 2021-08-10 | End: 2021-08-10 | Stop reason: HOSPADM

## 2021-08-10 RX ORDER — PROPOFOL 10 MG/ML
INJECTION, EMULSION INTRAVENOUS AS NEEDED
Status: DISCONTINUED | OUTPATIENT
Start: 2021-08-10 | End: 2021-08-10 | Stop reason: HOSPADM

## 2021-08-10 RX ORDER — DEXTROSE 50 % IN WATER (D50W) INTRAVENOUS SYRINGE
25-50 AS NEEDED
Status: DISCONTINUED | OUTPATIENT
Start: 2021-08-10 | End: 2021-08-10 | Stop reason: HOSPADM

## 2021-08-10 RX ORDER — HYDROCODONE BITARTRATE AND ACETAMINOPHEN 5; 325 MG/1; MG/1
1 TABLET ORAL
Status: DISCONTINUED | OUTPATIENT
Start: 2021-08-10 | End: 2021-08-10 | Stop reason: HOSPADM

## 2021-08-10 RX ORDER — MAGNESIUM SULFATE 100 %
4 CRYSTALS MISCELLANEOUS AS NEEDED
Status: DISCONTINUED | OUTPATIENT
Start: 2021-08-10 | End: 2021-08-10 | Stop reason: HOSPADM

## 2021-08-10 RX ORDER — NEOSTIGMINE METHYLSULFATE 1 MG/ML
INJECTION, SOLUTION INTRAVENOUS AS NEEDED
Status: DISCONTINUED | OUTPATIENT
Start: 2021-08-10 | End: 2021-08-10 | Stop reason: HOSPADM

## 2021-08-10 RX ORDER — LIDOCAINE HYDROCHLORIDE 10 MG/ML
INJECTION, SOLUTION EPIDURAL; INFILTRATION; INTRACAUDAL; PERINEURAL
Status: COMPLETED
Start: 2021-08-10 | End: 2021-08-10

## 2021-08-10 RX ORDER — GLYCOPYRROLATE 0.2 MG/ML
INJECTION INTRAMUSCULAR; INTRAVENOUS AS NEEDED
Status: DISCONTINUED | OUTPATIENT
Start: 2021-08-10 | End: 2021-08-10 | Stop reason: HOSPADM

## 2021-08-10 RX ORDER — DEXAMETHASONE SODIUM PHOSPHATE 4 MG/ML
INJECTION, SOLUTION INTRA-ARTICULAR; INTRALESIONAL; INTRAMUSCULAR; INTRAVENOUS; SOFT TISSUE AS NEEDED
Status: DISCONTINUED | OUTPATIENT
Start: 2021-08-10 | End: 2021-08-10 | Stop reason: HOSPADM

## 2021-08-10 RX ORDER — SODIUM CHLORIDE 0.9 % (FLUSH) 0.9 %
5-40 SYRINGE (ML) INJECTION AS NEEDED
Status: DISCONTINUED | OUTPATIENT
Start: 2021-08-10 | End: 2021-08-10 | Stop reason: HOSPADM

## 2021-08-10 RX ORDER — SODIUM CHLORIDE, SODIUM LACTATE, POTASSIUM CHLORIDE, CALCIUM CHLORIDE 600; 310; 30; 20 MG/100ML; MG/100ML; MG/100ML; MG/100ML
75 INJECTION, SOLUTION INTRAVENOUS CONTINUOUS
Status: DISCONTINUED | OUTPATIENT
Start: 2021-08-10 | End: 2021-08-10 | Stop reason: HOSPADM

## 2021-08-10 RX ORDER — LIDOCAINE HYDROCHLORIDE 20 MG/ML
INJECTION, SOLUTION EPIDURAL; INFILTRATION; INTRACAUDAL; PERINEURAL AS NEEDED
Status: DISCONTINUED | OUTPATIENT
Start: 2021-08-10 | End: 2021-08-10 | Stop reason: HOSPADM

## 2021-08-10 RX ORDER — FENTANYL CITRATE 50 UG/ML
INJECTION, SOLUTION INTRAMUSCULAR; INTRAVENOUS AS NEEDED
Status: DISCONTINUED | OUTPATIENT
Start: 2021-08-10 | End: 2021-08-10 | Stop reason: HOSPADM

## 2021-08-10 RX ORDER — SODIUM BICARBONATE 84 MG/ML
INJECTION, SOLUTION INTRAVENOUS
Status: COMPLETED
Start: 2021-08-10 | End: 2021-08-10

## 2021-08-10 RX ORDER — MIDAZOLAM HYDROCHLORIDE 1 MG/ML
INJECTION, SOLUTION INTRAMUSCULAR; INTRAVENOUS AS NEEDED
Status: DISCONTINUED | OUTPATIENT
Start: 2021-08-10 | End: 2021-08-10 | Stop reason: HOSPADM

## 2021-08-10 RX ORDER — HYDROCODONE BITARTRATE AND IBUPROFEN 7.5; 2 MG/1; MG/1
1 TABLET, FILM COATED ORAL
Qty: 10 TABLET | Refills: 0 | Status: SHIPPED | OUTPATIENT
Start: 2021-08-10 | End: 2021-08-13

## 2021-08-10 RX ORDER — ISOSULFAN BLUE 50 MG/5ML
INJECTION, SOLUTION SUBCUTANEOUS AS NEEDED
Status: DISCONTINUED | OUTPATIENT
Start: 2021-08-10 | End: 2021-08-10 | Stop reason: HOSPADM

## 2021-08-10 RX ORDER — LIDOCAINE HYDROCHLORIDE 10 MG/ML
0.1 INJECTION, SOLUTION EPIDURAL; INFILTRATION; INTRACAUDAL; PERINEURAL AS NEEDED
Status: DISCONTINUED | OUTPATIENT
Start: 2021-08-10 | End: 2021-08-10 | Stop reason: HOSPADM

## 2021-08-10 RX ORDER — FAMOTIDINE 20 MG/1
20 TABLET, FILM COATED ORAL ONCE
Status: COMPLETED | OUTPATIENT
Start: 2021-08-10 | End: 2021-08-10

## 2021-08-10 RX ORDER — ONDANSETRON 2 MG/ML
INJECTION INTRAMUSCULAR; INTRAVENOUS AS NEEDED
Status: DISCONTINUED | OUTPATIENT
Start: 2021-08-10 | End: 2021-08-10 | Stop reason: HOSPADM

## 2021-08-10 RX ORDER — HYDROMORPHONE HYDROCHLORIDE 2 MG/ML
0.5 INJECTION, SOLUTION INTRAMUSCULAR; INTRAVENOUS; SUBCUTANEOUS AS NEEDED
Status: DISCONTINUED | OUTPATIENT
Start: 2021-08-10 | End: 2021-08-10 | Stop reason: HOSPADM

## 2021-08-10 RX ADMIN — FENTANYL CITRATE 50 MCG: 50 INJECTION, SOLUTION INTRAMUSCULAR; INTRAVENOUS at 12:02

## 2021-08-10 RX ADMIN — FAMOTIDINE 20 MG: 20 TABLET ORAL at 09:00

## 2021-08-10 RX ADMIN — PROPOFOL 100 MG: 10 INJECTION, EMULSION INTRAVENOUS at 11:59

## 2021-08-10 RX ADMIN — LIDOCAINE HYDROCHLORIDE 100 MG: 20 INJECTION, SOLUTION EPIDURAL; INFILTRATION; INTRACAUDAL; PERINEURAL at 12:15

## 2021-08-10 RX ADMIN — HYDROMORPHONE HYDROCHLORIDE 0.5 MG: 2 INJECTION, SOLUTION INTRAMUSCULAR; INTRAVENOUS; SUBCUTANEOUS at 14:12

## 2021-08-10 RX ADMIN — LIDOCAINE HYDROCHLORIDE 3 ML: 10 INJECTION, SOLUTION EPIDURAL; INFILTRATION; INTRACAUDAL; PERINEURAL at 08:24

## 2021-08-10 RX ADMIN — HYDROMORPHONE HYDROCHLORIDE 0.5 MG: 2 INJECTION, SOLUTION INTRAMUSCULAR; INTRAVENOUS; SUBCUTANEOUS at 14:05

## 2021-08-10 RX ADMIN — FENTANYL CITRATE 50 MCG: 50 INJECTION, SOLUTION INTRAMUSCULAR; INTRAVENOUS at 12:15

## 2021-08-10 RX ADMIN — FENTANYL CITRATE 100 MCG: 50 INJECTION, SOLUTION INTRAMUSCULAR; INTRAVENOUS at 12:46

## 2021-08-10 RX ADMIN — DEXAMETHASONE SODIUM PHOSPHATE 8 MG: 4 INJECTION, SOLUTION INTRAMUSCULAR; INTRAVENOUS at 12:27

## 2021-08-10 RX ADMIN — ONDANSETRON 4 MG: 2 INJECTION INTRAMUSCULAR; INTRAVENOUS at 12:27

## 2021-08-10 RX ADMIN — MIDAZOLAM HYDROCHLORIDE 2 MG: 2 INJECTION, SOLUTION INTRAMUSCULAR; INTRAVENOUS at 12:13

## 2021-08-10 RX ADMIN — MIDAZOLAM HYDROCHLORIDE 2 MG: 2 INJECTION, SOLUTION INTRAMUSCULAR; INTRAVENOUS at 11:46

## 2021-08-10 RX ADMIN — SODIUM CHLORIDE, SODIUM LACTATE, POTASSIUM CHLORIDE, AND CALCIUM CHLORIDE 50 ML/HR: 600; 310; 30; 20 INJECTION, SOLUTION INTRAVENOUS at 09:00

## 2021-08-10 RX ADMIN — CLINDAMYCIN 600 MG: 150 INJECTION, SOLUTION INTRAMUSCULAR; INTRAVENOUS at 11:46

## 2021-08-10 RX ADMIN — SODIUM BICARBONATE 50 MEQ: 84 INJECTION, SOLUTION INTRAVENOUS at 08:10

## 2021-08-10 RX ADMIN — PROPOFOL 200 MG: 10 INJECTION, EMULSION INTRAVENOUS at 12:15

## 2021-08-10 NOTE — OP NOTES
Date of Procedure: 8/10/2021  Preoperative Diagnosis: C50.412 MALIGNANT NEOPLASM LEFT BREAST  Postoperative Diagnosis: C50.412 MALIGNANT NEOPLASM LEFT BREAST  Procedure(s):  Procedure(s):  LEFT PARTIAL MASTECTOMY WITH LOCALIZATION AND LEFT SENTINEL NODE BIOPSY (TAG HBV 8/4, [facility]08/10)    Surgeon(s) and Role:      Saji Sood MD - Primary         Surgical Staff: Circ-1: Rodolfo Vera RN  Scrub Tech-1: Sarah Galvez  Surg Asst-1: Haily Patel      Event Time In     Event Time In   Incision Start 1215   Incision Close      Event Time In   Patient In - Facility (Arrived) 2389   Patient In - Pre-op [phone]   Anesthesia Start [phone]   Patient Ready for Pre-op Visitors 4261   Patient Ready for OR    Surgeon Available    Patient Out - Pre-op 1146   Patient In - OR 1146   Surgeon In OR 1158   Incision Start 1215   Incision Close    Surgeon out of OR 1301   Patient Out - OR    Anesthesia Stop    Patient In - Phase I    Notice to Anesthesia    Care Complete - Phase I    Patient Out - Phase I    Patient In - Phase II    Patient Tolerates Liquids    Patient Ready for Visitors    Patient Education Complete    Patient Voided    Care Complete - Phase II    Patient Out - Phase II    End of 1210 Us 27 N    Patient In - Overflow    Patient Out - Overflow        Anesthesia: General  Anesthesia staff: Anesthesiologist: Janessa Riojas MD  CRNA: Dom Wisdom CRNA  Estimated Blood Loss: 20cc  Specimens:   ID Type Source Tests Collected by Time Destination   1 : LEFT breast mass Preservative Breast  Toña Humphreys MD 8/10/2021 1221 Pathology   2 : LEFT axillary contents Preservative Axillary  RepSumi cunha MD 8/10/2021 1258 Pathology        Findings:  posterior margin chest wall, anterior margin skin, nodes not hot but blue and abnormal, enlarged, firm - all palpably abnormal and visually blue nodes removed  Complications: none noted  Implants: * No implants in log *      The patient was identified in the preoperative holding area and the risks again were reviewed with the patient who understands and agrees. She understands the risk of bleeding, infection, damage to surrounding structures, hematoma/seroma formation, and the possibility of missing the lesion in question. She also understands additional surgery may be indicated. She was also marked by me to confirm the site and the procedure. The patient was taken to the operating suite and placed supine on the table. Compression stockings were placed and anesthesia induced without complication. Prior to the prep, an appropriate  time out was performed and the left breast injected with 2cc of blue dye. She was then prepped and draped in the usual sterile fashion and an appropriate time-out was performed to confirm the patient, the side, and the procedure. Local anesthetic was infiltrated. An elliptical incision was made at the lateral position in the left breast around the palpable area of concern. We then dissected into the subcutaneous tissue of the breast towards the lesion of concern. We then used electrocautery to remove a core of breast tissue around the lesion with attention to margins. There was excellent hemostasis and the specimen was marked for orientation on removal of the tissue. This was then handed off the field for pathologic analysis. Clips were placed to jazzy the site. The breast tissue was mobilized to close the  defect with 2-0 vicryl. All sponge and instrument counts were reported to me as correct. We continued the closure with a 3-0 vicryl suture, followed by 4-0 monocryl suture. In the axilla, I identified the appropriate region in the axilla with the gamma probe and accessed the axilla, dissecting into the deep axilla. All lymph nodes were identified in the deep axilla which were abnormal and blue. No nodes were hot. These were removed with cautery and / or harmonic scalpel. Dermabond was then applied.  The family was updated after the procedure. The patient was taken to recovery in good condition.

## 2021-08-10 NOTE — DISCHARGE INSTRUCTIONS
Patient Education        Mastectomy: What to Expect at Home  Your Recovery     Right after the surgery, you will probably feel weak, and you may feel sore for 2 to 3 days. You may feel pulling or stretching near or under your arm. You may also have itching, tingling, and throbbing in the area. This will get better in a few days. You will likely have several drains near your incision. These help with healing. The drains will be removed when the fluid buildup slows. Drains are usually removed in the first few weeks after surgery. You may be able to go back to your normal routine or return to work in several weeks, but it may take longer. How long it takes you to recover will depend on the type of surgery you had. It also depends on whether you had breast reconstruction at the same time, or if you need other treatment. Your doctor or nurse will be able to give you an idea of what you can expect. When you find out that you have cancer, you may feel many emotions and may need some help coping. This is common. Seek out family, friends, and counselors for support. You also can do things at home to make yourself feel better while you go through treatment. Call the [employer] (7-778.346.5214) or visit its website at [employer] to learn more. This care sheet gives you a general idea about how long it will take for you to recover. But each person recovers at a different pace. Follow the steps below to get better as quickly as possible. How can you care for yourself at home? Activity    · Rest when you feel tired. Getting enough sleep will help you recover. After any activity, rest and raise your affected arm for a period of time equal to your activity time.     · Try to walk each day. Start by walking a little more than you did the day before. Bit by bit, increase the amount you walk.  Walking boosts blood flow and helps prevent pneumonia and constipation.     · Avoid strenuous activities, such as biking, jogging, weightlifting, or aerobic exercise, until your doctor says it is okay. This includes housework, especially if you have to use your affected arm. You will probably be able to do your normal activities in 3 to 6 weeks. Avoid repeated motions with your affected arm, such as weed pulling, window cleaning, or vacuuming, for 6 months.     · Avoid lifting anything over 10 to 15 pounds for 4 to 6 weeks. This may include a child, grocery bags, a heavy briefcase or backpack, cat litter or dog food bags, or a vacuum .     · Ask your doctor when you can drive again.     · You will probably be able to go back to work or your normal routine in 3 to 6 weeks. This depends on the type of work you do and any further treatment.     · Your doctor will let you know how soon you can take showers or baths. Diet    · You can eat your normal diet. If your stomach is upset, try bland, low-fat foods like plain rice, broiled chicken, toast, and yogurt.     · Drink plenty of fluids (unless your doctor tells you not to).     · You may notice that your bowels are not regular right after your surgery. This is common. Try to avoid constipation and straining with bowel movements. Take a fiber supplement such as Citrucel or Metamucil every day. If you have not had a bowel movement after a couple of days, take a mild laxative like Milk of Magnesia or a stool softener like Colace. Medicines    · Your doctor will tell you if and when you can restart your medicines. He or she will also give you instructions about taking any new medicines.     · If you take aspirin or some other blood thinner, ask your doctor if and when to start taking it again. Make sure that you understand exactly what your doctor wants you to do.     · Take pain medicines exactly as directed. ? If the doctor gave you a prescription medicine for pain, take it as prescribed.   ? If you are not taking a prescription pain medicine, ask your doctor if you can take an over-the-counter medicine.     · If your doctor prescribed antibiotics, take them as directed. Do not stop taking them just because you feel better. You need to take the full course of antibiotics.     · If you think your pain medicine is making you sick to your stomach:  ? Take your medicine after meals (unless your doctor has told you not to). ? Ask your doctor for a different pain medicine. Incision care    · You will have a dressing over the cut (incision). A dressing helps the incision heal and protects it. Your doctor will tell you how to take care of this.     · A woman may wear a special bra (surgi-bra) that holds the dressing in place after the surgery. The doctor will say when the bra is no longer needed. Drain care    · You may have one or more drains near your incision. Your doctor will tell you how to take care of them. Arm exercises    · If you had any lymph nodes removed from under your arm, your doctor will advise you to do arm exercises. Do not do the exercises until your doctor says it is okay. Ice and elevation    · Do not use ice for swelling or pain.     · Prop up your arm on a pillow when you sit or lie down. Try to keep your arm above the level of your heart. This will help reduce swelling. Follow-up care is a key part of your treatment and safety. Be sure to make and go to all appointments, and call your doctor if you are having problems. It's also a good idea to know your test results and keep a list of the medicines you take. When should you call for help? Call 911 anytime you think you may need emergency care. For example, call if:    · You passed out (lost consciousness).     · You have chest pain, are short of breath, or cough up blood.    Call your doctor now or seek immediate medical care if:    · You are sick to your stomach or cannot drink fluids.     · You cannot pass stools or gas.     · You have pain that does not get better after you take your pain medicine.     · You have loose stitches, or your incision comes open.     · Bright red blood has soaked through the bandage over your incision.     · You have signs of a blood clot in your leg (called a deep vein thrombosis), such as:  ? Pain in your calf, back of the knee, thigh, or groin. ? Redness or swelling in your leg.     · You have signs of infection, such as:  ? Increased pain, swelling, warmth, or redness. ? Red streaks leading from the incision. ? Pus draining from the incision. ? A fever. Watch closely for changes in your health, and be sure to contact your doctor if:    · You have any problems.     · You have new or worse swelling or pain in your arm. Where can you learn more? Go to http://www.gray.com/  Enter S340 in the search box to learn more about \"Mastectomy: What to Expect at Home. \"  Current as of: December 17, 2020               Content Version: 12.8  © 2006-2021 [employer]. Care instructions adapted under license by [employer] (which disclaims liability or warranty for this information). If you have questions about a medical condition or this instruction, always ask your healthcare professional. [place] [address] any warranty or liability for your use of this information. Patient Education        [place] Lymph Node Biopsy: About This Test  What is it? A sentinel lymph node biopsy is a surgery to take out lymph node tissue to look for cancer that has spread into the lymph system. The lymph system is a network of vessels that carries material between the body tissues and the bloodstream. The sentinel lymph node is the first node in the body where cancer cells may be found if the cancer has spread from the original site. Why is this test done? This test is done to see if a cancer has spread from its original site. This information helps stage a cancer.  The stage is a way for doctors to describe how far the cancer has spread. Your treatment choices will be based partly on the type and stage of cancer. How do you prepare for the test?   · Follow the instructions exactly about when to stop eating and drinking. If you don't, your test may be canceled. If your doctor told you to take your medicines on the day of the test, take them with only a sip of water.     · Be sure you have someone to take you home. Anesthesia and pain medicine will make it unsafe for you to drive or get home on your own.     · Tell your doctor ALL the medicines, vitamins, supplements, and herbal remedies you take. Some may increase the risk of problems during your test. Your doctor will tell you if you should stop taking any of them before the test and how soon to do it.     · If you take aspirin or some other blood thinner, ask your doctor if you should stop taking it before your test. Make sure that you understand exactly what your doctor wants you to do. These medicines increase the risk of bleeding. How is the test done? · Your doctor injects a dye, a tracer, or both into your body near your cancer site. The dye stains the sentinel lymph node or nodes so they can be seen. The tracer travels to the sentinel lymph node where it can be detected. · Your doctor removes the sentinel node or nodes. The node is tested for cancer cells. The results help your doctor decide whether to remove any more nodes, either during the same surgery or at a later time. · You will have some stitches and a bandage over the biopsy site. How long does the test take? · The biopsy usually takes 30 to 60 minutes. It may take longer if you have surgery to remove the cancer at the same time. What happens after the test?  · If you had general anesthesia, you may feel drowsy for several hours after the biopsy. You may have a mild sore throat from the tube used to help you breathe during the biopsy.   · Throat lozenges and gargling with warm salt water may help soothe your sore throat. You may get medicine at the biopsy site that will help with the pain for 6 to 12 hours. You may have more pain after this medicine wears off. · The biopsy site may be sore for several days. · The doctor will tell you what to do if you have any bleeding, numbness, or swelling at the biopsy site. · You might be able to go home the same day. · Your skin may be blue from the dye for several days after the test. The dye may also turn your urine green for 1 to 2 days. · Allow the area to heal. Don't move quickly or lift anything heavy until you are feeling better. · During your follow-up visit, your doctor will discuss the results of your biopsy with you and take out any stitches. Follow-up care is a key part of your treatment and safety. Be sure to make and go to all appointments, and call your doctor if you are having problems. It's also a good idea to keep a list of the medicines you take. Ask your doctor when you can expect to have your test results. Where can you learn more? Go to http://www.gray.com/  Enter V333 in the search box to learn more about \"[place] Lymph Node Biopsy: About This Test.\"  Current as of: December 17, 2020               Content Version: 12.8  © 2006-2021 [employer]. Care instructions adapted under license by [employer] (which disclaims liability or warranty for this information). If you have questions about a medical condition or this instruction, always ask your healthcare professional. [place] [address] any warranty or liability for your use of this information.          DISCHARGE SUMMARY from Nurse    PATIENT INSTRUCTIONS:    After general anesthesia or intravenous sedation, for 24 hours or while taking prescription Narcotics:  · Limit your activities  · Do not drive and operate hazardous machinery  · Do not make important personal or business decisions  · Do  not drink alcoholic beverages  · If you have not urinated within 8 hours after discharge, please contact your surgeon on call.     Report the following to your surgeon:  · Excessive pain, swelling, redness or odor of or around the surgical area  · Temperature over 100.5  · Nausea and vomiting lasting longer than 4 hours or if unable to take medications  · Any signs of decreased circulation or nerve impairment to extremity: change in color, persistent  numbness, tingling, coldness or increase pain  · Any questions

## 2021-08-10 NOTE — ANESTHESIA POSTPROCEDURE EVALUATION
Procedure(s):  LEFT PARTIAL MASTECTOMY WITH LOCALIZATION AND LEFT SENTINEL NODE BIOPSY (TAG HBV 8/4, [facility]08/10). general    Anesthesia Post Evaluation      Multimodal analgesia: multimodal analgesia used between 6 hours prior to anesthesia start to PACU discharge  Patient location during evaluation: bedside  Patient participation: complete - patient participated  Level of consciousness: awake  Pain score: 2  Pain management: adequate  Airway patency: patent  Anesthetic complications: no  Cardiovascular status: stable  Respiratory status: acceptable  Hydration status: acceptable  Post anesthesia nausea and vomiting:  controlled  Final Post Anesthesia Temperature Assessment:  Normothermia (36.0-37.5 degrees C)      INITIAL Post-op Vital signs:   Vitals Value Taken Time   /56 08/10/21 1432   Temp 36.2 °C (97.1 °F) 08/10/21 1347   Pulse 66 08/10/21 1439   Resp 12 08/10/21 1439   SpO2 98 % 08/10/21 1439   Vitals shown include unvalidated device data.

## 2021-08-10 NOTE — ANESTHESIA PREPROCEDURE EVALUATION
Relevant Problems   No relevant active problems       Anesthetic History   No history of anesthetic complications            Review of Systems / Medical History  Patient summary reviewed and pertinent labs reviewed    Pulmonary  Within defined limits                 Neuro/Psych     seizures: well controlled         Cardiovascular                  Exercise tolerance: >4 METS     GI/Hepatic/Renal                Endo/Other        Cancer     Other Findings              Physical Exam    Airway  Mallampati: III  TM Distance: 4 - 6 cm  Neck ROM: decreased range of motion   Mouth opening: Normal     Cardiovascular    Rhythm: regular  Rate: normal         Dental  No notable dental hx       Pulmonary  Breath sounds clear to auscultation               Abdominal  GI exam deferred       Other Findings            Anesthetic Plan    ASA: 3  Anesthesia type: general          Induction: Intravenous  Anesthetic plan and risks discussed with: Patient

## 2021-08-10 NOTE — INTERVAL H&P NOTE
Update History & Physical    The Patient's History and Physical  was reviewed with the patient and I examined the patient. There was no change. The surgical site was confirmed by the patient and me. Patient requesting Vicoprofen for pain postoperatively. Allergies noted, confirmed patient has taken and tolerated this or similar medication in the past and this is her choice for narcotic pain medication. We discussed tylenol and ibuprofen may be sufficient, assuming she has not been told to avoid either medication (generally due to concerns for kidneys, stomach or liver). I have advised her to limit acetaminophen from all sources to less than 4,000mg per day and not to drive while taking narcotic pain medication. I have recommended taking narcotic pain medication sparingly and only if needed. If using the narcotic pain medication, I have recommended taking with food. We discussed this class of medication can constipate, so I have encouraged use of Miralax or Milk of Magnesia if constipation occurs. This class of medication can also be addicting. Again I recommend taking narcotic pain medication sparingly and only if needed. Plan:  The risk, benefits, expected outcome, and alternative to the recommended procedure have been discussed with the patient. Patient understands and wants to proceed with the procedure.     Electronically signed by Sita Cortez MD on 8/10/2021 at 11:07 AM

## 2021-08-16 ENCOUNTER — OFFICE VISIT (OUTPATIENT)
Dept: SURGERY | Age: 56
End: 2021-08-16
Payer: COMMERCIAL

## 2021-08-16 VITALS
HEIGHT: 61 IN | DIASTOLIC BLOOD PRESSURE: 62 MMHG | RESPIRATION RATE: 20 BRPM | TEMPERATURE: 97.2 F | BODY MASS INDEX: 32.1 KG/M2 | WEIGHT: 170 LBS | SYSTOLIC BLOOD PRESSURE: 112 MMHG

## 2021-08-16 DIAGNOSIS — Z17.0 MALIGNANT NEOPLASM OF UPPER-OUTER QUADRANT OF LEFT BREAST IN FEMALE, ESTROGEN RECEPTOR POSITIVE (HCC): Primary | ICD-10-CM

## 2021-08-16 DIAGNOSIS — C50.412 MALIGNANT NEOPLASM OF UPPER-OUTER QUADRANT OF LEFT BREAST IN FEMALE, ESTROGEN RECEPTOR POSITIVE (HCC): Primary | ICD-10-CM

## 2021-08-16 PROCEDURE — 99024 POSTOP FOLLOW-UP VISIT: CPT | Performed by: SURGERY

## 2021-08-16 RX ORDER — HYDROCODONE BITARTRATE AND IBUPROFEN 7.5; 2 MG/1; MG/1
1 TABLET, FILM COATED ORAL
Qty: 15 TABLET | Refills: 0 | Status: SHIPPED | OUTPATIENT
Start: 2021-08-16 | End: 2021-08-19

## 2021-08-16 NOTE — PROGRESS NOTES
Patient presents recurrent left T1aN1a ER/NE positive, HER negative breast cancer, s/p partial mastectomy, sentinel node biopsy 8/10/21. 1. Have you been to the ER, urgent care clinic since your last visit? Hospitalized since your last visit? No    2. Have you seen or consulted any other health care providers outside of the [address] [place] [address] since your last visit? Include any pap smears or colon screening.  No

## 2021-08-16 NOTE — LETTER
8/16/2021 10:39 AM    Patient:  Milton Lamas   YOB: 1965  Date of Visit: 8/16/2021      Elvia Hou MD  [address] [facility]  [address] [place] [address]  Via Fax: [phone]    Dear Elvia Hou MD,      I had the pleasure of seeing Ms. Milton Lamas in my office today for her breast cancer. I am including a copy of my office visit today. If you have questions, please do not hesitate to call me. I look forward to following Ms. Stanton along with you and will keep you updated as to her progress.            Sincerely,      Lucia Ulrich MD

## 2021-08-17 ENCOUNTER — NURSE NAVIGATOR (OUTPATIENT)
Dept: SURGERY | Age: 56
End: 2021-08-17

## 2021-08-17 NOTE — PROGRESS NOTES
Sofy Hernandez, eight days post op after left breast lumpectomy and left axillary node dissection, called office reporting increased axillary edema and pain. Patient denies fever and increased redness. Patient is very nervous and requests an appointment with Dr. Meenu Uribe tomorrow. Request relayed to Traci Cristina, .

## 2021-08-18 ENCOUNTER — OFFICE VISIT (OUTPATIENT)
Dept: SURGERY | Age: 56
End: 2021-08-18
Payer: COMMERCIAL

## 2021-08-18 VITALS — HEIGHT: 61 IN | TEMPERATURE: 98.2 F | RESPIRATION RATE: 20 BRPM | WEIGHT: 170 LBS | BODY MASS INDEX: 32.1 KG/M2

## 2021-08-18 DIAGNOSIS — C50.412 MALIGNANT NEOPLASM OF UPPER-OUTER QUADRANT OF LEFT BREAST IN FEMALE, ESTROGEN RECEPTOR POSITIVE (HCC): Primary | ICD-10-CM

## 2021-08-18 DIAGNOSIS — Z17.0 MALIGNANT NEOPLASM OF UPPER-OUTER QUADRANT OF LEFT BREAST IN FEMALE, ESTROGEN RECEPTOR POSITIVE (HCC): Primary | ICD-10-CM

## 2021-08-18 PROCEDURE — 99024 POSTOP FOLLOW-UP VISIT: CPT | Performed by: SURGERY

## 2021-08-18 NOTE — LETTER
8/18/2021 11:27 AM    Patient:  Cody Leigh   YOB: 1965  Date of Visit: 8/18/2021      Cheri Yuan MD  [address] [place] [address]  [address] [place] [address]  Via Fax: [phone]     Sally Triana MD  [address] [facility]  [address] [place] [address]  Via Fax: [phone]    Dear MD Sally Arias MD,      I had the pleasure of seeing Ms. Cody Leigh in my office today for her breast cancer. I am including a copy of my office visit today. If you have questions, please do not hesitate to call me. I look forward to following Ms. Stanton along with you and will keep you updated as to her progress.            Sincerely,      Saji Sood MD

## 2021-08-18 NOTE — PROGRESS NOTES
Breast Cancer       Ms. Mandy Brannon is a 60year old woman who was referred for recurrent left T1aN1a ER/[place] positive, HER negative breast cancer, s/p partial mastectomy, sentinel node biopsy 8/10/21after core biopsy 6/9/21 with a personal history of left Stage IIA ([id number]) ER/[place] positive, HER negative breast cancer s/p partial mastectomy 3/23/18 by Dr. Leticia Tan. Initial cancer Oncotype was 17. Patient declined radiation. She has been taking Femara since 12/18 per Dr. Maribell Roblero. The area of concern was identified on diagnostic imaging obtained for breast pain. She denied palpable mass. She denied nipple discharge. There is family history of breast cancer in her mother and grandmother and maternal cousin. She reports she has not had genetic testing. She is of Ashkenazi descent. Her most recent previous mammogram was 1/14/21. She has noted axillary swelling postoperatively. Breast/GYN history:    OB History    No obstetric history on file. Past Medical History:   Diagnosis Date    Ashkenazi Zoroastrian ancestry requiring population-specific genetic screening     Breast cancer ([address]     Left breast    Grand mal seizure ([address]     Seizures ([address]        Past Surgical History:   Procedure Laterality Date    HX BREAST BIOPSY Left 8/10/2021    LEFT PARTIAL MASTECTOMY WITH LOCALIZATION AND LEFT SENTINEL NODE BIOPSY (TAG HBV 8/4, [facility]08/10) performed by Tani Orellana MD at [address] [place] [address] HX BREAST LUMPECTOMY      Left breast       Current Outpatient Medications on File Prior to Visit   Medication Sig Dispense Refill    ergocalciferol (ERGOCALCIFEROL) 1,250 mcg (50,000 unit) capsule Take 50,000 Units by mouth.  letrozole (FEMARA) 2.5 mg tablet Take 2.5 mg by mouth daily.  phenytoin ER (DILANTIN ER) 100 mg ER capsule Take 200 mg by mouth two (2) times a day.       HYDROcodone-ibuprofen (VICOPROFEN) 7.5-200 mg per tablet Take 1 Tablet by mouth every eight (8) hours as needed for Pain for up to 3 days. Max Daily Amount: 3 Tablets. (Patient not taking: Reported on 8/18/2021) 15 Tablet 0     No current facility-administered medications on file prior to visit.        Allergies   Allergen Reactions    Ampicillin Hives    Azithromycin Hives    Cephalexin Hives    Ciprofloxacin Hives    Sulfamethoxazole-Trimethoprim Other (comments) and Itching       Social History     Tobacco Use    Smoking status: Never Smoker    Smokeless tobacco: Never Used   Vaping Use    Vaping Use: Never used   Substance Use Topics    Alcohol use: Not Currently    Drug use: Not Currently     Types: Marijuana, Cocaine     Comment: 1970's       Family History   Problem Relation Age of Onset    Breast Cancer Mother 58    Breast Cancer Maternal Grandmother     Breast Cancer Maternal Cousin          ROS: positives are bolded  General: fevers, chills, night sweats, fatigue, weight loss, weight gain  GI: nausea, vomiting, abdominal pain, change in bowel habits, hematochezia, melena, GERD  Integ: dermatitis, abnormal moles  HEENT: visual changes, vertigo, epistaxis, dysphagia, hoarseness  Cardiac: chest pain, palpitations, HTN, edema, varicosities  Resp: cough, shortness of breath, wheezing, hemoptysis, snoring, reactive airway disease  : hematuria, dysuria, frequency, urgency, nocturia, stress urinary incontinence   MSK: weakness, joint pain, arthritis  Endocrine:  thyroid disease, polyuria, polydipsia, polyphagia, poor wound healing, heat intolerance, cold intolerance  Lymph/Heme: anemia, bruising, history of blood transfusions  Neuro: dizziness, headache, fainting, seizures, stroke  Psych: anxiety, depression    Physical Exam:  Visit Vitals  Temp 98.2 °F (36.8 °C) (Skin)   Resp 20   Ht 5' 1\" (1.549 m)   Wt 77.1 kg (170 lb)   BMI 32.12 kg/m²       Gen:  No distress  Head: normocephalic, atraumatic  Mouth: Clear, no overt lesions, oral mucosa pink and moist  Neck: supple, no masses, no adenopathy, trachea midline  Resp: clear bilaterally  Cardio: Regular rate and rhythm  Abdomen: soft, nontender, nondistended  Extremities: warm, well-perfused  Neuro: sensation and strength grossly intact and symmetrical  Psych: alert and oriented to person, place and time  Breasts: palpation deferred, incision clean, seroma axilla, previously  Right: Examined in both the supine and upright positions. There was no supraclavicular, infraclavicular, or axillary lymphadenopathy. There were no dominant masses, no skin changes, no asymmetry identified   Left: Examined in both the supine and upright positions. There was no supraclavicular, infraclavicular, or axillary lymphadenopathy. There were no dominant masses, no skin changes, no asymmetry identified scar upper outer with nodularity ?hematoma vs mass vs scar tissue      Imagin21 left mammogram/ultrasound [facility])  Left 2:00 breast mass. Recommend ultrasound-guided core needle biopsy for further evaluation. Exam results were discussed with the patient. The referring clinician's office will be notified regarding the recommendations for left breast biopsy. Assessment Category 4: Suspicious     Signed By: Chavez Kaiser MD on 2021 2:31 PM    21 bilateral mammogram [facility])  No mammographic evidence of malignancy. Recommend annual screening mammography and clinical breast examination. A result letter will be sent to the patient. The patient will be notified by the [facility] reminder system for scheduling of her annual screening mammogram.     Assessment Category 2: Benign     Signed By: Edwina Calvillo MD on 2021 5:03 PM    Pathology:  8/10/21  A: Left breast mass, partial mastectomy:        Invasive ductal carcinoma, measuring 5 mm in greatest dimension. Invasive carcinoma present 0.5 mm from lateral/inferior margin with   extension into the deep dermis. Background breast tissue with biopsy site change and extensive sclerosing   apocrine adenosis.      B: Left axillary contents, excisional biopsy:        One of ten lymph nodes positive for metastatic carcinoma (1/10). INVASIVE CARCINOMA OF THE BREAST:       Procedure: Excision (less than total mastectomy)       Specimen Laterality: Left    TUMOR       Histologic Type:  Invasive carcinoma of no special type (ductal)       Overall Grade: Grade 2 (scores of 6 or 7)       Glandular (Acinar) / Tubular Differentiation: Score 2       Nuclear Pleomorphism: Score 2       Mitotic Rate: Score 2       Tumor Size: 5 mm       Ductal Carcinoma In Situ (DCIS): Not identified    Tumor Extent       Skin Invasion: Invasive carcinoma directly invades into the dermis   without skin ulceration (this does not change the T         classification)       Treatment Effect in the Breast: No definite response to presurgical         therapy in the invasive carcinoma       Treatment Effect in the Lymph Nodes: No definite response to         presurgical therapy in metastatic carcinoma    MARGINS       Invasive Carcinoma Margins: Uninvolved by invasive carcinoma           Distance from Closest Margin (Millimeters): 0.5 mm           Closest Margin(s): Inferior/Lateral    LYMPH NODES       Regional Lymph Nodes: Involved by tumor cells           Number of Lymph Nodes with Macrometastases (> 2 mm): 1           Number of Lymph Nodes with Micrometastases (> 0.2 mm to 2 mm and             / or > 200 cells): 0           Size of Largest Metastatic Deposit (Millimeters): 20 mm           Extranodal Extension: Present           Total Number of Lymph Nodes Examined: 10    PATHOLOGIC STAGE CLASSIFICATION (pTNM, AJCC 8th Edition)       TNM Descriptors: y (post-treatment)       Primary Tumor (pT): pT1a       Regional Lymph Nodes (pN): pN1a    SPECIAL STUDIES       Breast Biomarker Testing Performed on Previous Outside Biopsy:   Estrogen         Receptor (ER), Progesterone Receptor (PgR), HER2 (by         immunohistochemistry)           Estrogen Receptor (ER) Status: Positive (90%)           Progesterone Receptor (PgR) Status: Positive (10%)           HER2 (by immunohistochemistry): Equivocal (Score 2+)     6/9/21  A) LEFT BREAST MASS, CORE BIOPSY:       - INVASIVE CARCINOMA OF NOT SPECIAL TYPE (DUCTAL, NOS). - HISTOLOGIC stGstRstAstDstEst:st st1st OF 3.       - LARGEST FOCUS IN BIOPSY:  1.5 MM (MULTIPLE FRAGMENTS POSITIVE). - ANCILLARY STUDIES:          - ESTROGEN RECEPTOR: POSITIVE (90% CELS WITH MODERATE NUCLEAR STAINING)         - PROGESTERONE RECEPTOR: POSITIVE (10% CELLS WITH MODERATE NUCLEAR STAINING)         - HER-2: EQUIVOCAL (2+). FISH TEST RESULT TO FOLLOW IN A SEPARATE REPORT       Impression:  Patient Active Problem List   Diagnosis Code    Malignant neoplasm of upper-outer quadrant of left breast in female, estrogen receptor positive ([facility] C50.412, Z17.0         60year old woman who was referred for recurrent left T1aN1a ER/[place] positive, HER negative breast cancer, s/p partial mastectomy, sentinel node biopsy 8/10/21after core biopsy 6/9/21 with a personal history of left Stage IIA ([id number]) ER/AR positive, HER negative breast cancer s/p partial mastectomy 3/23/18 by Dr. Ander Whitman. Aspiration performed as below. Follow up 2 weeks. All questions were answered. She was asked to call with any additional questions or concerns.      [facility]  OFFICE PROCEDURE PROGRESS NOTE        Chart reviewed for the following:   Edita Bird MD, have reviewed the History, Physical and updated the Allergic reactions for [address] performed immediately prior to start of procedure:   I, Jacquelyn Moore MD, have performed the following reviews on Mary Kay Wiley prior to the start of the procedure:            * Patient was identified by name and date of birth   * Agreement on procedure being performed was verified  * Risks and Benefits explained to the patient  * Procedure site verified and marked as necessary  * Patient was positioned for comfort  * Consent was signed and verified     Date of procedure: 8/18/2021    Procedure: left axillary fine needle aspiration     Procedure performed by:  Madeline Ochoa MD    Provider assisted by: Keith Miranda LPN       Patient assisted by:     How tolerated by patient: tolerated the procedure well with no complications    Anesthesia: lidocaine with epi    Complications: none noted    Comments:       Procedure in detail:      Mandy Brannon is a 60 woman with left axillary seroma . We have discussed the risks and benefits of  the procedure including, but not limited to, bleeding, infection, need for repeat procedure and inadequate tissue and she agrees to proceed. Patient was prepped and draped in the usual fashion, timeout was performed and all parties agreed with the proposed procedure. Local anesthesia was then instilled in the affected area. The needle was advanced into the affected area and approximately 200cc aspirated. Patient tolerated the procedure well and there were no complications.

## 2021-08-18 NOTE — PROGRESS NOTES
1. Have you been to the ER, urgent care clinic since your last visit? Hospitalized since your last visit? No    2. Have you seen or consulted any other health care providers outside of the [address] [place] [address] since your last visit? Include any pap smears or colon screening. No     Patient presents for post op swelling.

## 2021-08-19 ENCOUNTER — TELEPHONE (OUTPATIENT)
Dept: SURGERY | Age: 56
End: 2021-08-19

## 2021-08-19 NOTE — PROGRESS NOTES
Breast Cancer       Ms. Ana Núñez is a 60year old woman who was referred for recurrent left T1aN1a ER/[place] positive, HER negative breast cancer, s/p partial mastectomy, sentinel node biopsy 8/10/21after core biopsy 6/9/21 with a personal history of left Stage IIA ([id number]) ER/SD positive, HER negative breast cancer s/p partial mastectomy 3/23/18 by Dr. Taylor Ojeda. Initial cancer Oncotype was 17. Patient declined radiation. She has been taking Femara since 12/18 per Dr. Ling Fontenot. The area of concern was identified on diagnostic imaging obtained for breast pain. She denied palpable mass. She denied nipple discharge. There is family history of breast cancer in her mother and grandmother and maternal cousin. She reports she has not had genetic testing. She is of Ashkenazi descent. Her most recent previous mammogram was 1/14/21. She has noted axillary swelling postoperatively. Aspiration performed 8/18/21. Breast/GYN history:    OB History    No obstetric history on file. Past Medical History:   Diagnosis Date    Ashkenazi Jew ancestry requiring population-specific genetic screening     [facility] ([facility]     Left breast    Grand mal seizure ([facility]     Seizures ([facility]        Past Surgical History:   Procedure Laterality Date    HX BREAST BIOPSY Left 8/10/2021    LEFT PARTIAL MASTECTOMY WITH LOCALIZATION AND LEFT SENTINEL NODE BIOPSY (TAG HBV 8/4, [facility]08/10) performed by Alissa Medrano MD at [address] [place] [address] HX BREAST LUMPECTOMY      Left breast       Current Outpatient Medications on File Prior to Visit   Medication Sig Dispense Refill    ergocalciferol (ERGOCALCIFEROL) 1,250 mcg (50,000 unit) capsule Take 50,000 Units by mouth.  letrozole (FEMARA) 2.5 mg tablet Take 2.5 mg by mouth daily.  phenytoin ER (DILANTIN ER) 100 mg ER capsule Take 200 mg by mouth two (2) times a day. No current facility-administered medications on file prior to visit.        Allergies Allergen Reactions    Ampicillin Hives    Azithromycin Hives    Cephalexin Hives    Ciprofloxacin Hives    Sulfamethoxazole-Trimethoprim Other (comments) and Itching       Social History     Tobacco Use    Smoking status: Never Smoker    Smokeless tobacco: Never Used   Vaping Use    Vaping Use: Never used   Substance Use Topics    Alcohol use: Not Currently    Drug use: Not Currently     Types: Marijuana, Cocaine     Comment: 1970's       Family History   Problem Relation Age of Onset    Breast Cancer Mother 58    Breast Cancer Maternal Grandmother     Breast Cancer Maternal Cousin          ROS: positives are bolded  General: fevers, chills, night sweats, fatigue, weight loss, weight gain  GI: nausea, vomiting, abdominal pain, change in bowel habits, hematochezia, melena, GERD  Integ: dermatitis, abnormal moles  HEENT: visual changes, vertigo, epistaxis, dysphagia, hoarseness  Cardiac: chest pain, palpitations, HTN, edema, varicosities  Resp: cough, shortness of breath, wheezing, hemoptysis, snoring, reactive airway disease  : hematuria, dysuria, frequency, urgency, nocturia, stress urinary incontinence   MSK: weakness, joint pain, arthritis  Endocrine:  thyroid disease, polyuria, polydipsia, polyphagia, poor wound healing, heat intolerance, cold intolerance  Lymph/Heme: anemia, bruising, history of blood transfusions  Neuro: dizziness, headache, fainting, seizures, stroke  Psych: anxiety, depression    Physical Exam:  Visit Vitals  Temp 97.2 °F (36.2 °C) (Skin)   Resp 16   Ht 5' 1\" (1.549 m)   Wt 79.4 kg (175 lb)   BMI 33.07 kg/m²       Gen:  No distress  Head: normocephalic, atraumatic  Mouth: Clear, no overt lesions, oral mucosa pink and moist  Neck: supple, no masses, no adenopathy, trachea midline  Resp: clear bilaterally  Cardio: Regular rate and rhythm  Abdomen: soft, nontender, nondistended  Extremities: warm, well-perfused  Neuro: sensation and strength grossly intact and symmetrical  Psych: alert and oriented to person, place and time  Breasts: palpation deferred, incision clean, seroma axilla, previously  Right: Examined in both the supine and upright positions. There was no supraclavicular, infraclavicular, or axillary lymphadenopathy. There were no dominant masses, no skin changes, no asymmetry identified   Left: Examined in both the supine and upright positions. There was no supraclavicular, infraclavicular, or axillary lymphadenopathy. There were no dominant masses, no skin changes, no asymmetry identified scar upper outer with nodularity ?hematoma vs mass vs scar tissue      Imagin21 left mammogram/ultrasound [facility])  Left 2:00 breast mass. Recommend ultrasound-guided core needle biopsy for further evaluation. Exam results were discussed with the patient. The referring clinician's office will be notified regarding the recommendations for left breast biopsy. Assessment Category 4: Suspicious     Signed By: Dl Rordiguez MD on 2021 2:31 PM    21 bilateral mammogram [facility])  No mammographic evidence of malignancy. Recommend annual screening mammography and clinical breast examination. A result letter will be sent to the patient. The patient will be notified by the [facility] reminder system for scheduling of her annual screening mammogram.     Assessment Category 2: Benign     Signed By: Colleen Barrera MD on 2021 5:03 PM    Pathology:  8/10/21  A: Left breast mass, partial mastectomy:        Invasive ductal carcinoma, measuring 5 mm in greatest dimension. Invasive carcinoma present 0.5 mm from lateral/inferior margin with   extension into the deep dermis. Background breast tissue with biopsy site change and extensive sclerosing   apocrine adenosis. B: Left axillary contents, excisional biopsy:        One of ten lymph nodes positive for metastatic carcinoma (1/10).      INVASIVE CARCINOMA OF THE BREAST:       Procedure: Excision (less than total mastectomy)       Specimen Laterality: Left    TUMOR       Histologic Type:  Invasive carcinoma of no special type (ductal)       Overall Grade: Grade 2 (scores of 6 or 7)       Glandular (Acinar) / Tubular Differentiation: Score 2       Nuclear Pleomorphism: Score 2       Mitotic Rate: Score 2       Tumor Size: 5 mm       Ductal Carcinoma In Situ (DCIS): Not identified    Tumor Extent       Skin Invasion: Invasive carcinoma directly invades into the dermis   without skin ulceration (this does not change the T         classification)       Treatment Effect in the Breast: No definite response to presurgical         therapy in the invasive carcinoma       Treatment Effect in the Lymph Nodes: No definite response to         presurgical therapy in metastatic carcinoma    MARGINS       Invasive Carcinoma Margins: Uninvolved by invasive carcinoma           Distance from Closest Margin (Millimeters): 0.5 mm           Closest Margin(s): Inferior/Lateral    LYMPH NODES       Regional Lymph Nodes: Involved by tumor cells           Number of Lymph Nodes with Macrometastases (> 2 mm): 1           Number of Lymph Nodes with Micrometastases (> 0.2 mm to 2 mm and             / or > 200 cells): 0           Size of Largest Metastatic Deposit (Millimeters): 20 mm           Extranodal Extension: Present           Total Number of Lymph Nodes Examined: 10    PATHOLOGIC STAGE CLASSIFICATION (pTNM, AJCC 8th Edition)       TNM Descriptors: y (post-treatment)       Primary Tumor (pT): pT1a       Regional Lymph Nodes (pN): pN1a    SPECIAL STUDIES       Breast Biomarker Testing Performed on Previous Outside Biopsy:   Estrogen         Receptor (ER), Progesterone Receptor (PgR), HER2 (by         immunohistochemistry)           Estrogen Receptor (ER) Status: Positive (90%)           Progesterone Receptor (PgR) Status: Positive (10%)           HER2 (by immunohistochemistry): Equivocal (Score 2+)     6/9/21  A) LEFT BREAST MASS, CORE BIOPSY:       - INVASIVE CARCINOMA OF NOT SPECIAL TYPE (DUCTAL, NOS). - HISTOLOGIC rdGrdRrdArdDrdErd:rd rd3rd OF 3.       - LARGEST FOCUS IN BIOPSY:  1.5 MM (MULTIPLE FRAGMENTS POSITIVE). - ANCILLARY STUDIES:          - ESTROGEN RECEPTOR: POSITIVE (90% CELS WITH MODERATE NUCLEAR STAINING)         - PROGESTERONE RECEPTOR: POSITIVE (10% CELLS WITH MODERATE NUCLEAR STAINING)         - HER-2: EQUIVOCAL (2+). FISH TEST RESULT TO FOLLOW IN A SEPARATE REPORT       Impression:  Patient Active Problem List   Diagnosis Code    Malignant neoplasm of upper-outer quadrant of left breast in female, estrogen receptor positive ([facility] C50.412, Z17.0         60year old woman who was referred for recurrent left T1aN1a ER/[place] positive, HER negative breast cancer, s/p partial mastectomy, sentinel node biopsy 8/10/21after core biopsy 6/9/21 with a personal history of left Stage IIA ([id number]) ER/[place] positive, HER negative breast cancer s/p partial mastectomy 3/23/18 by Dr. Shabnam Moss. Aspiration performed as below. Follow up 2 weeks. All questions were answered. She was asked to call with any additional questions or concerns.      [facility]  OFFICE PROCEDURE PROGRESS NOTE        Chart reviewed for the following:   Linard Nageotte, MD, have reviewed the History, Physical and updated the Allergic reactions for [address] performed immediately prior to start of procedure:   I, Janelle Denise MD, have performed the following reviews on Leda Current prior to the start of the procedure:            * Patient was identified by name and date of birth   * Agreement on procedure being performed was verified  * Risks and Benefits explained to the patient  * Procedure site verified and marked as necessary  * Patient was positioned for comfort  * Consent was signed and verified     Date of procedure: 8/23/2021    Procedure: left axillary fine needle aspiration     Procedure performed by: Judd Zabala MD    Provider assisted by: Brent Hancock LPN       Patient assisted by:     How tolerated by patient: tolerated the procedure well with no complications    Anesthesia: lidocaine with epi    Complications: none noted    Comments:       Procedure in detail:      Librado Melendrez is a 60 woman with left axillary seroma . We have discussed the risks and benefits of  the procedure including, but not limited to, bleeding, infection, need for repeat procedure and inadequate tissue and she agrees to proceed. Patient was prepped and draped in the usual fashion, timeout was performed and all parties agreed with the proposed procedure. Local anesthesia was then instilled in the affected area. The needle was advanced into the affected area and approximately 200cc aspirated. Patient tolerated the procedure well and there were no complications.

## 2021-08-19 NOTE — TELEPHONE ENCOUNTER
Received call from patient stating that her axilla has swollen again within 6 hours of aspiration yesterday. Per Dr. Naga Hood she can try warm compresses and ace bandage. Will make appointment for patient for Monday for possible aspiration.

## 2021-08-23 ENCOUNTER — OFFICE VISIT (OUTPATIENT)
Dept: SURGERY | Age: 56
End: 2021-08-23
Payer: COMMERCIAL

## 2021-08-23 VITALS — TEMPERATURE: 97.2 F | WEIGHT: 175 LBS | HEIGHT: 61 IN | RESPIRATION RATE: 16 BRPM | BODY MASS INDEX: 33.04 KG/M2

## 2021-08-23 DIAGNOSIS — Z17.0 MALIGNANT NEOPLASM OF UPPER-OUTER QUADRANT OF LEFT BREAST IN FEMALE, ESTROGEN RECEPTOR POSITIVE (HCC): Primary | ICD-10-CM

## 2021-08-23 DIAGNOSIS — C50.412 MALIGNANT NEOPLASM OF UPPER-OUTER QUADRANT OF LEFT BREAST IN FEMALE, ESTROGEN RECEPTOR POSITIVE (HCC): Primary | ICD-10-CM

## 2021-08-23 PROCEDURE — 99024 POSTOP FOLLOW-UP VISIT: CPT | Performed by: SURGERY

## 2021-08-23 NOTE — PROGRESS NOTES
Patient presents for referred for recurrent left T1aN1a ER/[place] positive, HER negative breast cancer, s/p partial mastectomy, sentinel node biopsy 8/10/21after core biopsy 6/9/21 with a personal history of left Stage IIA ([id number]) ER/ND positive, HER negative breast cancer s/p partial mastectomy 3/23/18 by Dr. Luiz Carlson    1. Have you been to the ER, urgent care clinic since your last visit? Hospitalized since your last visit? No    2. Have you seen or consulted any other health care providers outside of the [address] [place] [address] since your last visit? Include any pap smears or colon screening.  No

## 2021-08-31 ENCOUNTER — HOSPITAL ENCOUNTER (OUTPATIENT)
Dept: LAB | Age: 56
Discharge: HOME | End: 2021-08-31

## 2021-09-01 ENCOUNTER — HOSPITAL ENCOUNTER (OUTPATIENT)
Dept: RADIATION THERAPY | Age: 56
Discharge: HOME | End: 2021-09-01
Payer: COMMERCIAL

## 2021-09-01 ENCOUNTER — OFFICE VISIT (OUTPATIENT)
Dept: SURGERY | Age: 56
End: 2021-09-01
Payer: COMMERCIAL

## 2021-09-01 VITALS — TEMPERATURE: 97.6 F | WEIGHT: 177 LBS | HEIGHT: 61 IN | RESPIRATION RATE: 20 BRPM | BODY MASS INDEX: 33.42 KG/M2

## 2021-09-01 DIAGNOSIS — C50.412 MALIGNANT NEOPLASM OF UPPER-OUTER QUADRANT OF LEFT BREAST IN FEMALE, ESTROGEN RECEPTOR POSITIVE (HCC): Primary | ICD-10-CM

## 2021-09-01 DIAGNOSIS — Z17.0 MALIGNANT NEOPLASM OF UPPER-OUTER QUADRANT OF LEFT BREAST IN FEMALE, ESTROGEN RECEPTOR POSITIVE (HCC): Primary | ICD-10-CM

## 2021-09-01 PROCEDURE — 99211 OFF/OP EST MAY X REQ PHY/QHP: CPT

## 2021-09-01 PROCEDURE — 99024 POSTOP FOLLOW-UP VISIT: CPT | Performed by: SURGERY

## 2021-09-01 RX ORDER — OXYCODONE AND ACETAMINOPHEN 5; 325 MG/1; MG/1
1 TABLET ORAL
Qty: 10 TABLET | Refills: 0 | Status: SHIPPED | OUTPATIENT
Start: 2021-09-01 | End: 2021-09-04

## 2021-09-01 NOTE — PROGRESS NOTES
Patient presents for post op. 1. Have you been to the ER, urgent care clinic since your last visit? Hospitalized since your last visit? No    2. Have you seen or consulted any other health care providers outside of the [address] [place] [address] since your last visit? Include any pap smears or colon screening.  No

## 2021-09-15 ENCOUNTER — APPOINTMENT (OUTPATIENT)
Dept: RADIATION THERAPY | Age: 56
End: 2021-09-15
Payer: COMMERCIAL

## 2021-09-15 ENCOUNTER — NURSE NAVIGATOR (OUTPATIENT)
Dept: SURGERY | Age: 56
End: 2021-09-15

## 2021-09-15 ENCOUNTER — OFFICE VISIT (OUTPATIENT)
Dept: SURGERY | Age: 56
End: 2021-09-15
Payer: COMMERCIAL

## 2021-09-15 VITALS
BODY MASS INDEX: 33.23 KG/M2 | WEIGHT: 176 LBS | HEART RATE: 74 BPM | DIASTOLIC BLOOD PRESSURE: 67 MMHG | HEIGHT: 61 IN | SYSTOLIC BLOOD PRESSURE: 107 MMHG | OXYGEN SATURATION: 98 %

## 2021-09-15 DIAGNOSIS — C50.412 MALIGNANT NEOPLASM OF UPPER-OUTER QUADRANT OF LEFT BREAST IN FEMALE, ESTROGEN RECEPTOR POSITIVE (HCC): Primary | ICD-10-CM

## 2021-09-15 DIAGNOSIS — Z17.0 MALIGNANT NEOPLASM OF UPPER-OUTER QUADRANT OF LEFT BREAST IN FEMALE, ESTROGEN RECEPTOR POSITIVE (HCC): Primary | ICD-10-CM

## 2021-09-15 PROCEDURE — 99024 POSTOP FOLLOW-UP VISIT: CPT | Performed by: SURGERY

## 2021-09-15 NOTE — LETTER
9/15/2021 1:44 PM    Patient:  Teodoro Haines   YOB: 1965  Date of Visit: 9/15/2021      Kee Hector MD  [address] [facility]  [address] [place] [address]  Via Fax: [phone]    Dear Kee Hector MD,      I had the pleasure of seeing Ms. Teodoro Haines in my office today for her breast cancer. I am including a copy of my office visit today. If you have questions, please do not hesitate to call me. I look forward to following Ms. Stanton along with you and will keep you updated as to her progress.            Sincerely,      Axel Cerda MD

## 2021-09-15 NOTE — PROGRESS NOTES
Breast Cancer       Ms. Coco Walsh is a 60year old woman who was referred for recurrent left T1aN1a ER/[place] positive, HER negative breast cancer, s/p partial mastectomy, sentinel node biopsy 8/10/21after core biopsy 6/9/21 with a personal history of left Stage IIA ([id number]) ER/[place] positive, HER negative breast cancer s/p partial mastectomy 3/23/18 by Dr. Elza Mendez. Initial cancer Oncotype was 17. Patient declined radiation. She has been taking Femara since 12/18 per Dr. Boston Lopez. The area of concern was identified on diagnostic imaging obtained for breast pain. She denied palpable mass. She denied nipple discharge. There is family history of breast cancer in her mother and grandmother and maternal cousin. She reports she has not had genetic testing. She is of Ashkenazi descent. Her most recent previous mammogram was 1/14/21. She has noted axillary swelling postoperatively. Aspiration performed 8/18/21, 8/23/21 and 9/1/21. She notes less swelling and discomfort this time. Dr. Boston Lopez has recommended consultation at [address]. Oncotype returned 31 with 24% risk of distant recurrence with antiestrogen and approximately 15% benefit to chemotherapy. Breast/GYN history:    OB History    No obstetric history on file. Past Medical History:   Diagnosis Date    Ashkenazi Buddhist ancestry requiring population-specific genetic screening     Breast cancer ([address]     Left breast    Grand mal seizure ([address]     Seizures ([address]        Past Surgical History:   Procedure Laterality Date    HX BREAST BIOPSY Left 8/10/2021    LEFT PARTIAL MASTECTOMY WITH LOCALIZATION AND LEFT SENTINEL NODE BIOPSY (TAG [facility] 8/4, [facility]08/10) performed by Letty Ulloa MD at [address] [place] [address] HX BREAST LUMPECTOMY      Left breast       Current Outpatient Medications on File Prior to Visit   Medication Sig Dispense Refill    ergocalciferol (ERGOCALCIFEROL) 1,250 mcg (50,000 unit) capsule Take 50,000 Units by mouth.       letrozole (FEMARA) 2.5 mg tablet Take 2.5 mg by mouth daily. (Patient not taking: Reported on 9/1/2021)      phenytoin ER (DILANTIN ER) 100 mg ER capsule Take 200 mg by mouth two (2) times a day. No current facility-administered medications on file prior to visit.        Allergies   Allergen Reactions    Ampicillin Hives    Azithromycin Hives    Cephalexin Hives    Ciprofloxacin Hives    Sulfamethoxazole-Trimethoprim Other (comments) and Itching       Social History     Tobacco Use    Smoking status: Never Smoker    Smokeless tobacco: Never Used   Vaping Use    Vaping Use: Never used   Substance Use Topics    Alcohol use: Not Currently    Drug use: Not Currently     Types: Marijuana, Cocaine     Comment: 1970's       Family History   Problem Relation Age of Onset    Breast Cancer Mother 58    Breast Cancer Maternal Grandmother     Breast Cancer Maternal Cousin          ROS: positives are bolded  General: fevers, chills, night sweats, fatigue, weight loss, weight gain  GI: nausea, vomiting, abdominal pain, change in bowel habits, hematochezia, melena, GERD  Integ: dermatitis, abnormal moles  HEENT: visual changes, vertigo, epistaxis, dysphagia, hoarseness  Cardiac: chest pain, palpitations, HTN, edema, varicosities  Resp: cough, shortness of breath, wheezing, hemoptysis, snoring, reactive airway disease  : hematuria, dysuria, frequency, urgency, nocturia, stress urinary incontinence   MSK: weakness, joint pain, arthritis  Endocrine:  thyroid disease, polyuria, polydipsia, polyphagia, poor wound healing, heat intolerance, cold intolerance  Lymph/Heme: anemia, bruising, history of blood transfusions  Neuro: dizziness, headache, fainting, seizures, stroke  Psych: anxiety, depression    Physical Exam:  Visit Vitals  /67 (BP 1 Location: Right lower arm, BP Patient Position: Sitting)   Pulse 74   Wt 79.8 kg (176 lb)   BMI 33.25 kg/m²       Gen:  No distress  Head: normocephalic, atraumatic  Mouth: Clear, no overt lesions, oral mucosa pink and moist  Neck: supple, no masses, no adenopathy, trachea midline  Resp: clear bilaterally  Cardio: Regular rate and rhythm  Abdomen: soft, nontender, nondistended  Extremities: warm, well-perfused  Neuro: sensation and strength grossly intact and symmetrical  Psych: alert and oriented to person, place and time  Breasts: palpation deferred, incision clean, seroma axilla, much smaller previously  Right: Examined in both the supine and upright positions. There was no supraclavicular, infraclavicular, or axillary lymphadenopathy. There were no dominant masses, no skin changes, no asymmetry identified   Left: Examined in both the supine and upright positions. There was no supraclavicular, infraclavicular, or axillary lymphadenopathy. There were no dominant masses, no skin changes, no asymmetry identified scar upper outer with nodularity ?hematoma vs mass vs scar tissue      Imagin21 left mammogram/ultrasound [facility])  Left 2:00 breast mass. Recommend ultrasound-guided core needle biopsy for further evaluation. Exam results were discussed with the patient. The referring clinician's office will be notified regarding the recommendations for left breast biopsy. Assessment Category 4: Suspicious     Signed By: Raffaele Urbano MD on 2021 2:31 PM    21 bilateral mammogram [facility])  No mammographic evidence of malignancy. Recommend annual screening mammography and clinical breast examination. A result letter will be sent to the patient. The patient will be notified by the [facility] reminder system for scheduling of her annual screening mammogram.     Assessment Category 2: Benign     Signed By: Nanda Massey MD on 2021 5:03 PM    Pathology:  8/10/21  A: Left breast mass, partial mastectomy:        Invasive ductal carcinoma, measuring 5 mm in greatest dimension.    Invasive carcinoma present 0.5 mm from [address] [place] [address] margin with   extension into the deep dermis. Background breast tissue with biopsy site change and extensive sclerosing   apocrine adenosis. B: Left axillary contents, excisional biopsy:        One of ten lymph nodes positive for metastatic carcinoma (1/10). INVASIVE CARCINOMA OF THE BREAST:       Procedure: Excision (less than total mastectomy)       Specimen Laterality: Left    TUMOR       Histologic Type:  Invasive carcinoma of no special type (ductal)       Overall Grade: Grade 2 (scores of 6 or 7)       Glandular (Acinar) / Tubular Differentiation: Score 2       Nuclear Pleomorphism: Score 2       Mitotic Rate: Score 2       Tumor Size: 5 mm       Ductal Carcinoma In Situ (DCIS): Not identified    Tumor Extent       Skin Invasion: Invasive carcinoma directly invades into the dermis   without skin ulceration (this does not change the T         classification)       Treatment Effect in the Breast: No definite response to presurgical         therapy in the invasive carcinoma       Treatment Effect in the Lymph Nodes: No definite response to         presurgical therapy in metastatic carcinoma    MARGINS       Invasive Carcinoma Margins: Uninvolved by invasive carcinoma           Distance from Closest Margin (Millimeters): 0.5 mm           Closest Margin(s): Inferior/Lateral    LYMPH NODES       Regional Lymph Nodes: Involved by tumor cells           Number of Lymph Nodes with Macrometastases (> 2 mm): 1           Number of Lymph Nodes with Micrometastases (> 0.2 mm to 2 mm and             / or > 200 cells): 0           Size of Largest Metastatic Deposit (Millimeters): 20 mm           Extranodal Extension: Present           Total Number of Lymph Nodes Examined: 10    PATHOLOGIC STAGE CLASSIFICATION (pTNM, AJCC 8th Edition)       TNM Descriptors: y (post-treatment)       Primary Tumor (pT): pT1a       Regional Lymph Nodes (pN): pN1a    SPECIAL STUDIES       Breast Biomarker Testing Performed on Previous Outside Biopsy:   Estrogen         Receptor (ER), Progesterone Receptor (PgR), HER2 (by         immunohistochemistry)           Estrogen Receptor (ER) Status: Positive (90%)           Progesterone Receptor (PgR) Status: Positive (10%)           HER2 (by immunohistochemistry): Equivocal (Score 2+)     6/9/21  A) LEFT BREAST MASS, CORE BIOPSY:       - INVASIVE CARCINOMA OF NOT SPECIAL TYPE (DUCTAL, NOS). - HISTOLOGIC rdGrdRrdArdDrdErd:rd rd3rd OF 3.       - LARGEST FOCUS IN BIOPSY:  1.5 MM (MULTIPLE FRAGMENTS POSITIVE). - ANCILLARY STUDIES:          - ESTROGEN RECEPTOR: POSITIVE (90% CELS WITH MODERATE NUCLEAR STAINING)         - PROGESTERONE RECEPTOR: POSITIVE (10% CELLS WITH MODERATE NUCLEAR STAINING)         - HER-2: EQUIVOCAL (2+). FISH TEST RESULT TO FOLLOW IN A SEPARATE REPORT       Impression:  Patient Active Problem List   Diagnosis Code    Malignant neoplasm of upper-outer quadrant of left breast in female, estrogen receptor positive [facility] C50.412, Z17.0         60year old woman who was referred for recurrent left T1aN1a ER/[place] positive, HER negative breast cancer, s/p partial mastectomy, sentinel node biopsy 8/10/21after core biopsy 6/9/21 with a personal history of left Stage IIA ([id number]) ER/AR positive, HER negative breast cancer s/p partial mastectomy 3/23/18 by Dr. Irma Velasquez. Oncotype reviewed. Follow up with Dr. Max Tom to discuss further as already scheduled. Follow up with me 1 month. All questions were answered. She was asked to call with any additional questions or concerns.

## 2021-09-15 NOTE — PROGRESS NOTES
Patient is in the office today for follow up with Dr. Srini Betancourt. Ms. Michael Warner was recently diagnosed with ER/DE po HER2 neg Invasive Carcinoma of the breast with lymph node involvement. She is being followed by Dr. Marlene Ahumada for medical oncology. Ms. Michael Warner learned today that chemotherapy will be recommended for her due to oncotype score of 31. She is visibly  distraught and anxious. She is crying with her head in her hands. I provided emotional support. Ms. Michael Warner had many questions about chemotherapy, as well as misinformation concerning chemotherapy, stating that she had been told that \"chemotherapy makes the cancer spread\". I provided education about the purpose of chemotherapy and its effect on cancer cells. I explained her pathology report . She is concerned about getting a mediport. I used a mediport model to demonstrate its mechanism and appearance under the skin. At the end of our conversation she expressed understanding. I provided my contact information and encouraged her to call me with any questions or concerns. She is concerned about nutrition during chemotherapy. Referral to Kemal Katz dietician, initiated as well as referral to John Rojo .

## 2021-09-27 ENCOUNTER — TELEPHONE (OUTPATIENT)
Dept: SURGERY | Age: 56
End: 2021-09-27

## 2021-09-29 ENCOUNTER — TELEPHONE (OUTPATIENT)
Dept: SURGERY | Age: 56
End: 2021-09-29

## 2021-09-29 NOTE — TELEPHONE ENCOUNTER
Medical Nutrition Therapy Note    Nutritional Assessment:  IBW: Ideal body weight: 105 lb 6.1 oz (47.8 kg)  Adjusted ideal body weight: 133 lb 10 oz (60.6 kg)   UBW: unk  Wt hx:   Wt Readings from Last 20 Encounters:   09/15/21 176 lb (79.8 kg)   09/01/21 177 lb (80.3 kg)   08/23/21 175 lb (79.4 kg)   08/18/21 170 lb (77.1 kg)   08/16/21 170 lb (77.1 kg)   08/10/21 170 lb (77.1 kg)   07/14/21 168 lb (76.2 kg)     Current Ht:   Ht Readings from Last 1 Encounters:   09/15/21 5' 1\" (1.549 m)       Current BMI: Estimated body mass index is 33.25 kg/m² as calculated from the following:    Height as of 9/15/21: 5' 1\" (1.549 m). Weight as of 9/15/21: 176 lb (79.8 kg). Wt change: 4.7% weight gain over the last 2 month(s)    Estimated nutritional needs:   Calorie Range: 1660   Formula: MSJ x 1.25  Protein Range: 95   Formula: 1.2 g/kg  Fluid Needs: 1 mL/kcal    Nutrition-impact symptoms: None    09/29/21: Pt had numerous questions regarding the anticipation of nutritionally impacted symptoms. Pt currently endorses dietary intake is at baseline. Pt currently denies any nutritionally impacted symptoms. Nutritional Diagnosis: No nutritional diagnosis at this time    Nutritional Interventions:   1. Answered all pt questions to her satisfaction  2. Encouraged pt not to restrict diet unless necessitated by a nutritional barrier while undergoing treatment    Nutritional Monitoring/Goals:   1. Will monitor for any nutrition-impacted symptoms    Initial consult per nurse navigator    Cancer Dx: No matching staging information was found for the patient. Cancer Staging  No matching staging information was found for the patient. Treatment Plan: No flowsheet data found. +   Radiation Treatments     No radiation treatments to show.  (Treatments may have been administered in another system.)          Lab Results   Component Value Date/Time    Sodium 138 08/10/2021 08:55 AM    Potassium 3.7 08/10/2021 08:55 AM    Chloride 107 08/10/2021 08:55 AM    CO2 26 08/10/2021 08:55 AM    Anion gap 5 08/10/2021 08:55 AM    Glucose 80 08/10/2021 08:55 AM    BUN 21 (H) 08/10/2021 08:55 AM    Creatinine 0.59 (L) 08/10/2021 08:55 AM    BUN/Creatinine ratio 36 (H) 08/10/2021 08:55 AM    GFR est AA >60 08/10/2021 08:55 AM    GFR est non-AA >60 08/10/2021 08:55 AM    Calcium 9.3 08/10/2021 08:55 AM         Current Outpatient Medications:     ergocalciferol (ERGOCALCIFEROL) 1,250 mcg (50,000 unit) capsule, Take 50,000 Units by mouth., Disp: , Rfl:     letrozole (FEMARA) 2.5 mg tablet, Take 2.5 mg by mouth daily.  (Patient not taking: Reported on 9/1/2021), Disp: , Rfl:     phenytoin ER (DILANTIN ER) 100 mg ER capsule, Take 200 mg by mouth two (2) times a day., Disp: , Rfl:     Diet/po intake: unk

## 2021-09-30 NOTE — PROGRESS NOTES
Ana hernandez is scheduled to have her first chemotherapy infusion on 10/12/21 with Dr. Ling Fontenot. She reports that neurologist is changing her anti-seizure medication to [address] due to incompatibility with Adriamycin.

## 2021-10-29 ENCOUNTER — TELEPHONE (OUTPATIENT)
Dept: SURGERY | Age: 56
End: 2021-10-29

## 2021-10-29 ENCOUNTER — TELEPHONE (OUTPATIENT)
Age: 56
End: 2021-10-29

## 2021-10-29 NOTE — TELEPHONE ENCOUNTER
Medical Nutrition Therapy Note    Nutritional Assessment:  IBW: Ideal body weight: 105 lb 6.1 oz (47.8 kg)  Adjusted ideal body weight: 133 lb 10 oz (60.6 kg)   UBW: unk  Wt hx:   Wt Readings from Last 20 Encounters:   09/15/21 176 lb (79.8 kg)   09/01/21 177 lb (80.3 kg)   08/23/21 175 lb (79.4 kg)   08/18/21 170 lb (77.1 kg)   08/16/21 170 lb (77.1 kg)   08/10/21 170 lb (77.1 kg)   07/14/21 168 lb (76.2 kg)     Current Ht:   Ht Readings from Last 1 Encounters:   09/15/21 5' 1\" (1.549 m)       Current BMI: Estimated body mass index is 33.25 kg/m² as calculated from the following:    Height as of 9/15/21: 5' 1\" (1.549 m). Weight as of 9/15/21: 176 lb (79.8 kg). Wt change: 4.7% weight gain over the last 2 month(s)    Estimated nutritional needs:   Calorie Range: 1660   Formula: MSJ x 1.25  Protein Range: 95   Formula: 1.2 g/kg  Fluid Needs: 1 mL/kcal    Nutrition-impact symptoms: None    9/29/21: Pt had numerous questions regarding the anticipation of nutritionally impacted symptoms. Pt currently endorses dietary intake is at baseline. Pt currently denies any nutritionally impacted symptoms. 10/29/21: Called spouse's phone for pt follow up, but spouse stated pt was at work so he'll tell pt to call writer back. Nutritional Diagnosis: No nutritional diagnosis at this time    Nutritional Interventions:   1. Answered all pt questions to her satisfaction  2. Encouraged pt not to restrict diet unless necessitated by a nutritional barrier while undergoing treatment    Nutritional Monitoring/Goals:   1. Will monitor for any nutrition-impacted symptoms    Initial consult per nurse navigator    Cancer Dx: No matching staging information was found for the patient. Cancer Staging  No matching staging information was found for the patient. Treatment Plan: No flowsheet data found. +   Radiation Treatments     No radiation treatments to show.  (Treatments may have been administered in another system.)          Lab Results Component Value Date/Time    Sodium 138 08/10/2021 08:55 AM    Potassium 3.7 08/10/2021 08:55 AM    Chloride 107 08/10/2021 08:55 AM    CO2 26 08/10/2021 08:55 AM    Anion gap 5 08/10/2021 08:55 AM    Glucose 80 08/10/2021 08:55 AM    BUN 21 (H) 08/10/2021 08:55 AM    Creatinine 0.59 (L) 08/10/2021 08:55 AM    BUN/Creatinine ratio 36 (H) 08/10/2021 08:55 AM    GFR est AA >60 08/10/2021 08:55 AM    GFR est non-AA >60 08/10/2021 08:55 AM    Calcium 9.3 08/10/2021 08:55 AM         Current Outpatient Medications:     ergocalciferol (ERGOCALCIFEROL) 1,250 mcg (50,000 unit) capsule, Take 50,000 Units by mouth., Disp: , Rfl:     letrozole (FEMARA) 2.5 mg tablet, Take 2.5 mg by mouth daily.  (Patient not taking: Reported on 9/1/2021), Disp: , Rfl:     phenytoin ER (DILANTIN ER) 100 mg ER capsule, Take 200 mg by mouth two (2) times a day., Disp: , Rfl:     Diet/po intake: unk

## 2021-10-29 NOTE — TELEPHONE ENCOUNTER
Medical Nutrition Therapy Note    Nutritional Assessment:  IBW: Ideal body weight: 105 lb 6.1 oz (47.8 kg)  Adjusted ideal body weight: 133 lb 10 oz (60.6 kg)   UBW: unk  Wt hx:   Wt Readings from Last 20 Encounters:   09/15/21 176 lb (79.8 kg)   09/01/21 177 lb (80.3 kg)   08/23/21 175 lb (79.4 kg)   08/18/21 170 lb (77.1 kg)   08/16/21 170 lb (77.1 kg)   08/10/21 170 lb (77.1 kg)   07/14/21 168 lb (76.2 kg)     Current Ht:   Ht Readings from Last 1 Encounters:   09/15/21 5' 1\" (1.549 m)       Current BMI: Estimated body mass index is 33.25 kg/m² as calculated from the following:    Height as of 9/15/21: 5' 1\" (1.549 m). Weight as of 9/15/21: 176 lb (79.8 kg). Wt change: 4.7% weight gain over the last 2 month(s)    Estimated nutritional needs:   Calorie Range: 1660   Formula: MSJ x 1.25  Protein Range: 95   Formula: 1.2 g/kg  Fluid Needs: 1 mL/kcal    Nutrition-impact symptoms: Nausea    9/29/21: Pt had numerous questions regarding the anticipation of nutritionally impacted symptoms. Pt currently endorses dietary intake is at baseline. Pt currently denies any nutritionally impacted symptoms. 10/29/21: Called spouse's phone for pt follow up, but spouse stated pt was at work so he'll tell pt to call writer back. 10/29/21: Pt left  for writer. Called pt back and pt endorses nausea. Pt was at pharmacy picking up a Rx and was having difficulty concentrating and answering writer's questions. Pt reports being told that she has 4 prescriptions at home for nausea, but was unable to tell writer what they are or which one's she's tried. Nutritional Diagnosis: No nutritional diagnosis at this time    Nutritional Interventions:   1. Started to provide recommendations, but pt was unable to follow so \"Nausea and Vomiting\" handout was e-mailed to spouse's address with instruction to contact writer with any questions regarding the handout when pt is better able to concentrate    Nutritional Monitoring/Goals:   1.  Will monitor for any nutrition-impacted symptoms  2. Answered all pt questions to her satisfaction  3. Encouraged pt not to restrict diet unless necessitated by a nutritional barrier while undergoing treatment    Initial consult per nurse navigator    Cancer Dx: No matching staging information was found for the patient. Cancer Staging  No matching staging information was found for the patient. Treatment Plan: No flowsheet data found. +   Radiation Treatments     No radiation treatments to show. (Treatments may have been administered in another system.)          Lab Results   Component Value Date/Time    Sodium 138 08/10/2021 08:55 AM    Potassium 3.7 08/10/2021 08:55 AM    Chloride 107 08/10/2021 08:55 AM    CO2 26 08/10/2021 08:55 AM    Anion gap 5 08/10/2021 08:55 AM    Glucose 80 08/10/2021 08:55 AM    BUN 21 (H) 08/10/2021 08:55 AM    Creatinine 0.59 (L) 08/10/2021 08:55 AM    BUN/Creatinine ratio 36 (H) 08/10/2021 08:55 AM    GFR est AA >60 08/10/2021 08:55 AM    GFR est non-AA >60 08/10/2021 08:55 AM    Calcium 9.3 08/10/2021 08:55 AM         Current Outpatient Medications:     ergocalciferol (ERGOCALCIFEROL) 1,250 mcg (50,000 unit) capsule, Take 50,000 Units by mouth., Disp: , Rfl:     letrozole (FEMARA) 2.5 mg tablet, Take 2.5 mg by mouth daily.  (Patient not taking: Reported on 9/1/2021), Disp: , Rfl:     phenytoin ER (DILANTIN ER) 100 mg ER capsule, Take 200 mg by mouth two (2) times a day., Disp: , Rfl:     Diet/po intake: unk

## 2021-11-05 ENCOUNTER — TELEPHONE (OUTPATIENT)
Dept: SURGERY | Age: 56
End: 2021-11-05

## 2021-11-05 NOTE — TELEPHONE ENCOUNTER
Medical Nutrition Therapy Note    Nutritional Assessment:  IBW: Ideal body weight: 105 lb 6.1 oz (47.8 kg)  Adjusted ideal body weight: 133 lb 10 oz (60.6 kg)   UBW: unk  Wt hx:   Wt Readings from Last 20 Encounters:   09/15/21 176 lb (79.8 kg)   09/01/21 177 lb (80.3 kg)   08/23/21 175 lb (79.4 kg)   08/18/21 170 lb (77.1 kg)   08/16/21 170 lb (77.1 kg)   08/10/21 170 lb (77.1 kg)   07/14/21 168 lb (76.2 kg)     Current Ht:   Ht Readings from Last 1 Encounters:   09/15/21 5' 1\" (1.549 m)       Current BMI: Estimated body mass index is 33.25 kg/m² as calculated from the following:    Height as of 9/15/21: 5' 1\" (1.549 m). Weight as of 9/15/21: 176 lb (79.8 kg). Wt change: 4.7% weight gain over the last 2 month(s)    Estimated nutritional needs:   Calorie Range: 1660   Formula: MSJ x 1.25  Protein Range: 95   Formula: 1.2 g/kg  Fluid Needs: 1 mL/kcal    Nutrition-impact symptoms: Nausea    9/29/21: Pt had numerous questions regarding the anticipation of nutritionally impacted symptoms. Pt currently endorses dietary intake is at baseline. Pt currently denies any nutritionally impacted symptoms. 10/29/21: Called spouse's phone for pt follow up, but spouse stated pt was at work so he'll tell pt to call writer back. 10/29/21: Pt left  for writer. Called pt back and pt endorses nausea. Pt was at pharmacy picking up a Rx and was having difficulty concentrating and answering writer's questions. Pt reports being told that she has 4 prescriptions at home for nausea, but was unable to tell writer what they are or which one's she's tried. 11/5/21: Pt reports just getting back from chemo infusion and now resting. Pt agreed to contact writer should any nutritional barriers occur including nausea. Nutritional Diagnosis: No nutritional diagnosis at this time    Nutritional Interventions:   1. Encouraged pt to contact writer should any nutritional questions or concerns arise    Nutritional Monitoring/Goals:   1.  Will monitor for any nutrition-impacted symptoms  2. Answered all pt questions to her satisfaction  3. Encouraged pt not to restrict diet unless necessitated by a nutritional barrier while undergoing treatment  4. Started to provide recommendations, but pt was unable to follow so \"Nausea and Vomiting\" handout was e-mailed to spouse's address with instruction to contact writer with any questions regarding the handout when pt is better able to concentrate    Initial consult per nurse navigator    Cancer Dx: No matching staging information was found for the patient. Cancer Staging  No matching staging information was found for the patient. Treatment Plan: No flowsheet data found. +   Radiation Treatments     No radiation treatments to show. (Treatments may have been administered in another system.)          Lab Results   Component Value Date/Time    Sodium 138 08/10/2021 08:55 AM    Potassium 3.7 08/10/2021 08:55 AM    Chloride 107 08/10/2021 08:55 AM    CO2 26 08/10/2021 08:55 AM    Anion gap 5 08/10/2021 08:55 AM    Glucose 80 08/10/2021 08:55 AM    BUN 21 (H) 08/10/2021 08:55 AM    Creatinine 0.59 (L) 08/10/2021 08:55 AM    BUN/Creatinine ratio 36 (H) 08/10/2021 08:55 AM    GFR est AA >60 08/10/2021 08:55 AM    GFR est non-AA >60 08/10/2021 08:55 AM    Calcium 9.3 08/10/2021 08:55 AM         Current Outpatient Medications:     ergocalciferol (ERGOCALCIFEROL) 1,250 mcg (50,000 unit) capsule, Take 50,000 Units by mouth., Disp: , Rfl:     letrozole (FEMARA) 2.5 mg tablet, Take 2.5 mg by mouth daily.  (Patient not taking: Reported on 9/1/2021), Disp: , Rfl:     phenytoin ER (DILANTIN ER) 100 mg ER capsule, Take 200 mg by mouth two (2) times a day., Disp: , Rfl:     Diet/po intake: unk

## 2022-01-06 ENCOUNTER — HOSPITAL ENCOUNTER (EMERGENCY)
Age: 57
Discharge: HOME | End: 2022-01-06
Attending: STUDENT IN AN ORGANIZED HEALTH CARE EDUCATION/TRAINING PROGRAM

## 2022-01-06 ENCOUNTER — APPOINTMENT (OUTPATIENT)
Dept: GENERAL RADIOLOGY | Age: 57
End: 2022-01-06
Attending: EMERGENCY MEDICINE

## 2022-01-06 VITALS
SYSTOLIC BLOOD PRESSURE: 100 MMHG | TEMPERATURE: 98.5 F | OXYGEN SATURATION: 99 % | RESPIRATION RATE: 19 BRPM | HEIGHT: 60 IN | BODY MASS INDEX: 33.38 KG/M2 | WEIGHT: 170 LBS | DIASTOLIC BLOOD PRESSURE: 71 MMHG | HEART RATE: 91 BPM

## 2022-01-06 DIAGNOSIS — Z91.89 AT HIGH RISK FOR INFECTION DUE TO CHEMOTHERAPY: ICD-10-CM

## 2022-01-06 DIAGNOSIS — Z92.21 AT HIGH RISK FOR INFECTION DUE TO CHEMOTHERAPY: ICD-10-CM

## 2022-01-06 DIAGNOSIS — N39.0 ACUTE UTI: ICD-10-CM

## 2022-01-06 DIAGNOSIS — U07.1 COVID: Primary | ICD-10-CM

## 2022-01-06 LAB
ALBUMIN SERPL-MCNC: 3.6 G/DL (ref 3.4–5)
ALBUMIN/GLOB SERPL: 0.8 {RATIO} (ref 0.8–1.7)
ALP SERPL-CCNC: 93 U/L (ref 45–117)
ALT SERPL-CCNC: 69 U/L (ref 13–56)
ANION GAP SERPL CALC-SCNC: 6 MMOL/L (ref 3–18)
APPEARANCE UR: CLEAR
AST SERPL-CCNC: 65 U/L (ref 10–38)
BASOPHILS # BLD: 0 K/UL (ref 0–0.1)
BASOPHILS NFR BLD: 0 % (ref 0–2)
BILIRUB SERPL-MCNC: 0.5 MG/DL (ref 0.2–1)
BILIRUB UR QL: NEGATIVE
BUN SERPL-MCNC: 13 MG/DL (ref 7–18)
BUN/CREAT SERPL: 18 (ref 12–20)
CALCIUM SERPL-MCNC: 9.3 MG/DL (ref 8.5–10.1)
CHLORIDE SERPL-SCNC: 103 MMOL/L (ref 100–111)
CO2 SERPL-SCNC: 29 MMOL/L (ref 21–32)
COLOR UR: YELLOW
CREAT SERPL-MCNC: 0.72 MG/DL (ref 0.6–1.3)
DIFFERENTIAL METHOD BLD: ABNORMAL
EOSINOPHIL # BLD: 0.1 K/UL (ref 0–0.4)
EOSINOPHIL NFR BLD: 3 % (ref 0–5)
EPITH CASTS URNS QL MICRO: NORMAL /LPF (ref 0–5)
ERYTHROCYTE [DISTWIDTH] IN BLOOD BY AUTOMATED COUNT: 16.2 % (ref 11.6–14.5)
GLOBULIN SER CALC-MCNC: 4.3 G/DL (ref 2–4)
GLUCOSE SERPL-MCNC: 94 MG/DL (ref 74–99)
GLUCOSE UR STRIP.AUTO-MCNC: NEGATIVE MG/DL
HCT VFR BLD AUTO: 35.6 % (ref 35–45)
HGB BLD-MCNC: 12 G/DL (ref 12–16)
HGB UR QL STRIP: NEGATIVE
IMM GRANULOCYTES # BLD AUTO: 0 K/UL (ref 0–0.04)
IMM GRANULOCYTES NFR BLD AUTO: 0 % (ref 0–0.5)
KETONES UR QL STRIP.AUTO: NEGATIVE MG/DL
LACTATE BLD-SCNC: 1.31 MMOL/L (ref 0.4–2)
LEUKOCYTE ESTERASE UR QL STRIP.AUTO: ABNORMAL
LYMPHOCYTES # BLD: 0.6 K/UL (ref 0.9–3.6)
LYMPHOCYTES NFR BLD: 20 % (ref 21–52)
MCH RBC QN AUTO: 31.5 PG (ref 24–34)
MCHC RBC AUTO-ENTMCNC: 33.7 G/DL (ref 31–37)
MCV RBC AUTO: 93.4 FL (ref 78–100)
MONOCYTES # BLD: 0.4 K/UL (ref 0.05–1.2)
MONOCYTES NFR BLD: 14 % (ref 3–10)
NEUTS SEG # BLD: 1.9 K/UL (ref 1.8–8)
NEUTS SEG NFR BLD: 63 % (ref 40–73)
NITRITE UR QL STRIP.AUTO: NEGATIVE
NRBC # BLD: 0 K/UL (ref 0–0.01)
NRBC BLD-RTO: 0 PER 100 WBC
PH UR STRIP: 6.5 [PH] (ref 5–8)
PLATELET # BLD AUTO: 211 K/UL (ref 135–420)
PMV BLD AUTO: 8.9 FL (ref 9.2–11.8)
POTASSIUM SERPL-SCNC: 3.8 MMOL/L (ref 3.5–5.5)
PROT SERPL-MCNC: 7.9 G/DL (ref 6.4–8.2)
PROT UR STRIP-MCNC: ABNORMAL MG/DL
RBC # BLD AUTO: 3.81 M/UL (ref 4.2–5.3)
RBC #/AREA URNS HPF: NORMAL /HPF (ref 0–5)
SODIUM SERPL-SCNC: 138 MMOL/L (ref 136–145)
SP GR UR REFRACTOMETRY: 1.02 (ref 1–1.03)
UROBILINOGEN UR QL STRIP.AUTO: 1 EU/DL (ref 0.2–1)
WBC # BLD AUTO: 3 K/UL (ref 4.6–13.2)
WBC URNS QL MICRO: NORMAL /HPF (ref 0–4)

## 2022-01-06 PROCEDURE — 74011000258 HC RX REV CODE- 258: Performed by: EMERGENCY MEDICINE

## 2022-01-06 PROCEDURE — 74011250637 HC RX REV CODE- 250/637: Performed by: EMERGENCY MEDICINE

## 2022-01-06 PROCEDURE — 83605 ASSAY OF LACTIC ACID: CPT

## 2022-01-06 PROCEDURE — 87040 BLOOD CULTURE FOR BACTERIA: CPT

## 2022-01-06 PROCEDURE — 74011000636 HC RX REV CODE- 636: Performed by: EMERGENCY MEDICINE

## 2022-01-06 PROCEDURE — 87086 URINE CULTURE/COLONY COUNT: CPT

## 2022-01-06 PROCEDURE — 71045 X-RAY EXAM CHEST 1 VIEW: CPT

## 2022-01-06 PROCEDURE — 85025 COMPLETE CBC W/AUTO DIFF WBC: CPT

## 2022-01-06 PROCEDURE — 81001 URINALYSIS AUTO W/SCOPE: CPT

## 2022-01-06 PROCEDURE — M0243 CASIRIVI AND IMDEVI INFUSION: HCPCS

## 2022-01-06 PROCEDURE — 99283 EMERGENCY DEPT VISIT LOW MDM: CPT

## 2022-01-06 PROCEDURE — 80053 COMPREHEN METABOLIC PANEL: CPT

## 2022-01-06 RX ORDER — GUAIFENESIN/DEXTROMETHORPHAN 100-10MG/5
10 SYRUP ORAL
Status: COMPLETED | OUTPATIENT
Start: 2022-01-06 | End: 2022-01-06

## 2022-01-06 RX ORDER — NITROFURANTOIN 25; 75 MG/1; MG/1
100 CAPSULE ORAL 2 TIMES DAILY
Qty: 6 CAPSULE | Refills: 0 | Status: SHIPPED | OUTPATIENT
Start: 2022-01-06 | End: 2022-01-09

## 2022-01-06 RX ADMIN — CASIRIVIMAB AND IMDEVIMAB 1200 MG: 600; 600 INJECTION, SOLUTION, CONCENTRATE INTRAVENOUS at 17:01

## 2022-01-06 RX ADMIN — GUAIFENESIN AND DEXTROMETHORPHAN 10 ML: 100; 10 SYRUP ORAL at 17:01

## 2022-01-06 NOTE — ED PROVIDER NOTES
EMERGENCY DEPARTMENT HISTORY AND PHYSICAL EXAM    Date: 1/6/2022  Patient Name: Paulina Storey    History of Presenting Illness     Chief Complaint   Patient presents with    Fever         History Provided By: Patient  Additional History (Context): Paulina Storey is a 60 y.o. female with Breast cancer, Covid positive who presents with fever this week. Test on Sunday showed she had Covid. She has had intermittent fever since Monday. Advised by her team to come in today for blood work. He says she is coughing up large amounts of green sputum. PCP: Kourtney Mondragon MD    Current Outpatient Medications   Medication Sig Dispense Refill    ergocalciferol (ERGOCALCIFEROL) 1,250 mcg (50,000 unit) capsule Take 50,000 Units by mouth.  letrozole (FEMARA) 2.5 mg tablet Take 2.5 mg by mouth daily. (Patient not taking: Reported on 9/1/2021)      phenytoin ER (DILANTIN ER) 100 mg ER capsule Take 200 mg by mouth two (2) times a day.          Past History     Past Medical History:  Past Medical History:   Diagnosis Date    Ashkenazi Rastafari ancestry requiring population-specific genetic screening     Breast cancer ([facility] [facility]     Left breast    Grand mal seizure ([facility]     Seizures ([facility] [facility]        Past Surgical History:  Past Surgical History:   Procedure Laterality Date    HX BREAST BIOPSY Left 8/10/2021    LEFT PARTIAL MASTECTOMY WITH LOCALIZATION AND LEFT SENTINEL NODE BIOPSY (TAG HBV 8/4, SLNB MMC08/10) performed by Rufina Harper MD at [facility] MAIN OR    HX BREAST LUMPECTOMY      Left breast       Family History:  Family History   Problem Relation Age of Onset    Breast Cancer Mother 58    Breast Cancer Maternal Grandmother     Breast Cancer Maternal Cousin        Social History:  Social History     Tobacco Use    Smoking status: Never Smoker    Smokeless tobacco: Never Used   Vaping Use    Vaping Use: Never used   Substance Use Topics    Alcohol use: Not Currently    Drug use: Not Currently     Types: Marijuana, Cocaine     Comment: 1970's       Allergies: Allergies   Allergen Reactions    Ampicillin Hives    Azithromycin Hives    Cephalexin Hives    Ciprofloxacin Hives    Sulfamethoxazole-Trimethoprim Other (comments) and Itching         Review of Systems   Review of Systems   Constitutional: Positive for fever. Respiratory: Positive for cough. Negative for shortness of breath. Gastrointestinal: Positive for nausea. Negative for abdominal pain. Skin: Negative for rash. Allergic/Immunologic: Positive for immunocompromised state. All Other Systems Negative  Physical Exam     Vitals:    01/06/22 1236   BP: 100/71   Pulse: 91   Resp: 19   Temp: 98.5 °F (36.9 °C)   SpO2: 99%   Weight: 77.1 kg (170 lb)   Height: 5' (1.524 m)     Physical Exam  Vitals and nursing note reviewed. Constitutional:       Appearance: She is well-developed and normal weight. She is ill-appearing. HENT:      Head: Normocephalic and atraumatic. Eyes:      Pupils: Pupils are equal, round, and reactive to light. Neck:      Thyroid: No thyromegaly. Vascular: No JVD. Trachea: No tracheal deviation. Cardiovascular:      Rate and Rhythm: Normal rate and regular rhythm. Heart sounds: Normal heart sounds. No murmur heard. No friction rub. No gallop. Pulmonary:      Effort: Pulmonary effort is normal. No respiratory distress. Breath sounds: No stridor. Rhonchi present. No wheezing or rales. Comments: Bronchospastic coughing  Chest:      Chest wall: No tenderness. Abdominal:      General: There is no distension. Palpations: Abdomen is soft. There is no mass. Tenderness: There is no abdominal tenderness. There is no guarding or rebound. Musculoskeletal:         General: No tenderness. Lymphadenopathy:      Cervical: No cervical adenopathy. Skin:     General: Skin is warm and dry. Coloration: Skin is not pale. Findings: No erythema or rash.    Neurological:      Mental Status: She is alert and oriented to person, place, and time. Psychiatric:         Behavior: Behavior normal.         Thought Content: Thought content normal.          Diagnostic Study Results     Labs -     Recent Results (from the past 12 hour(s))   CBC WITH AUTOMATED DIFF    Collection Time: 01/06/22  2:30 PM   Result Value Ref Range    WBC 3.0 (L) 4.6 - 13.2 K/uL    RBC 3.81 (L) 4.20 - 5.30 M/uL    HGB 12.0 12.0 - 16.0 g/dL    HCT 35.6 35.0 - 45.0 %    MCV 93.4 78.0 - 100.0 FL    MCH 31.5 24.0 - 34.0 PG    MCHC 33.7 31.0 - 37.0 g/dL    RDW 16.2 (H) 11.6 - 14.5 %    PLATELET 829 753 - 396 K/uL    MPV 8.9 (L) 9.2 - 11.8 FL    NRBC 0.0 0  WBC    ABSOLUTE NRBC 0.00 0.00 - 0.01 K/uL    NEUTROPHILS 63 40 - 73 %    LYMPHOCYTES 20 (L) 21 - 52 %    MONOCYTES 14 (H) 3 - 10 %    EOSINOPHILS 3 0 - 5 %    BASOPHILS 0 0 - 2 %    IMMATURE GRANULOCYTES 0 0.0 - 0.5 %    ABS. NEUTROPHILS 1.9 1.8 - 8.0 K/UL    ABS. LYMPHOCYTES 0.6 (L) 0.9 - 3.6 K/UL    ABS. MONOCYTES 0.4 0.05 - 1.2 K/UL    ABS. EOSINOPHILS 0.1 0.0 - 0.4 K/UL    ABS. BASOPHILS 0.0 0.0 - 0.1 K/UL    ABS. IMM. GRANS. 0.0 0.00 - 0.04 K/UL    DF AUTOMATED     METABOLIC PANEL, COMPREHENSIVE    Collection Time: 01/06/22  2:30 PM   Result Value Ref Range    Sodium 138 136 - 145 mmol/L    Potassium 3.8 3.5 - 5.5 mmol/L    Chloride 103 100 - 111 mmol/L    CO2 29 21 - 32 mmol/L    Anion gap 6 3.0 - 18 mmol/L    Glucose 94 74 - 99 mg/dL    BUN 13 7.0 - 18 MG/DL    Creatinine 0.72 0.6 - 1.3 MG/DL    BUN/Creatinine ratio 18 12 - 20      GFR est AA >60 >60 ml/min/1.73m2    GFR est non-AA >60 >60 ml/min/1.73m2    Calcium 9.3 8.5 - 10.1 MG/DL    Bilirubin, total 0.5 0.2 - 1.0 MG/DL    ALT (SGPT) 69 (H) 13 - 56 U/L    AST (SGOT) 65 (H) 10 - 38 U/L    Alk.  phosphatase 93 45 - 117 U/L    Protein, total 7.9 6.4 - 8.2 g/dL    Albumin 3.6 3.4 - 5.0 g/dL    Globulin 4.3 (H) 2.0 - 4.0 g/dL    A-G Ratio 0.8 0.8 - 1.7     POC LACTIC ACID    Collection Time: 01/06/22  2:30 PM   Result Value Ref Range    Lactic Acid (POC) 1.31 0.40 - 2.00 mmol/L   URINALYSIS W/ RFLX MICROSCOPIC    Collection Time: 01/06/22  4:04 PM   Result Value Ref Range    Color YELLOW      Appearance CLEAR      Specific gravity 1.024 1.005 - 1.030      pH (UA) 6.5 5.0 - 8.0      Protein TRACE (A) NEG mg/dL    Glucose Negative NEG mg/dL    Ketone Negative NEG mg/dL    Bilirubin Negative NEG      Blood Negative NEG      Urobilinogen 1.0 0.2 - 1.0 EU/dL    Nitrites Negative NEG      Leukocyte Esterase SMALL (A) NEG     URINE MICROSCOPIC ONLY    Collection Time: 01/06/22  4:04 PM   Result Value Ref Range    WBC 11 to 20 0 - 4 /hpf    RBC 0 to 3 0 - 5 /hpf    Epithelial cells 1+ 0 - 5 /lpf       Radiologic Studies -   XR CHEST PORT   Final Result      No acute radiographic findings. CT Results  (Last 48 hours)    None        CXR Results  (Last 48 hours)               01/06/22 1428  XR CHEST PORT Final result    Impression:      No acute radiographic findings. Narrative:  EXAMINATION: Chest single view       INDICATION: Cough, Covid positive       COMPARISON: None       FINDINGS: Single frontal view the chest obtained. Right neck Mediport catheter   tip at mid SVC level. Mediastinal silhouette and pulmonary vasculature   unremarkable. No consolidation. No evidence of pneumothorax. No obvious acute   osseous findings. Medical Decision Making   I am the first provider for this patient. I reviewed the vital signs, available nursing notes, past medical history, past surgical history, family history and social history. Vital Signs-Reviewed the patient's vital signs. Records Reviewed: Nursing Notes    Procedures:  Procedures    Provider Notes (Medical Decision Making): Blood cultures urine chest x-ray obtained. Lactic acid negative. Chest x-ray shows no pneumonia. Patient is not tachycardic tachypneic or hypoxic. Patient has Covid. She is afebrile here.   Urine Osmel tract infection to be treated with Macrobid and culture sent. Advised to isolate for the remainder of her duration. MED RECONCILIATION:  No current facility-administered medications for this encounter. Current Outpatient Medications   Medication Sig    ergocalciferol (ERGOCALCIFEROL) 1,250 mcg (50,000 unit) capsule Take 50,000 Units by mouth.  letrozole (FEMARA) 2.5 mg tablet Take 2.5 mg by mouth daily. (Patient not taking: Reported on 9/1/2021)    phenytoin ER (DILANTIN ER) 100 mg ER capsule Take 200 mg by mouth two (2) times a day. Disposition:  home    DISCHARGE NOTE:   5:27 PM    Pt has been reexamined. Patient has no new complaints, changes, or physical findings. Care plan outlined and precautions discussed. Results of labs, CXR were reviewed with the patient. All medications were reviewed with the patient; will d/c home with macrobid. All of pt's questions and concerns were addressed. Patient was instructed and agrees to follow up with PCP, as well as to return to the ED upon further deterioration. Patient is ready to go home. Follow-up Information     Follow up With Specialties Details Why Contact Shaw Uriarte MD [facility] Medicine Schedule an appointment as soon as possible for a visit in 1 day  [address] [facility]  [address] [address] [phone]      [address] [facility] EMERGENCY DEPT Emergency Medicine  If symptoms worsen return immediately [address] [facility]  [phone]          Current Discharge Medication List          Diagnosis     Clinical Impression:   1. COVID    2. Acute UTI    3.  At high risk for infection due to chemotherapy

## 2022-01-07 ENCOUNTER — PATIENT OUTREACH (OUTPATIENT)
Dept: CASE MANAGEMENT | Age: 57
End: 2022-01-07

## 2022-01-07 NOTE — PROGRESS NOTES
Patient contacted regarding COVID-19 diagnosis, monoclonal antibody infusion follow up. LPN Care Coordinator contacted the patient by telephone to perform post discharge assessment. Call within 2 business days of discharge: Yes Verified name and  with patient as identifiers. Provided introduction to self, and explanation of the LPN CC role, and reason for call due to risk factors for infection and/or exposure to COVID-19. Symptoms reviewed with patient who verbalized the following symptoms: cough. Spoke with patient. She states her fever has finally broken since receiving the iv infusion but she has been coughing alot. Her pcp has given her a prescription for the cough. Due to no new or worsening symptoms encounter was not routed to provider for escalation. Discussed follow-up appointments. If no appointment was previously scheduled, appointment scheduling offered:  no. [facility] follow up appointment(s): No future appointments. Non-[facility] follow up appointment(s): pcp as needed       Advance Care Planning:   Does patient have an Advance Directive: healthcare decision maker on file. Patient was given an opportunity to verbalize any questions and concerns and agrees to contact LPN CC or health care provider for questions related to their healthcare. Reviewed and educated patient on any new and changed medications related to discharge diagnosis     LPN CC provided contact information. Plan for follow-up call in 5-7 days based on severity of symptoms and risk factors.

## 2022-01-08 LAB
BACTERIA SPEC CULT: NORMAL
CC UR VC: NORMAL
SERVICE CMNT-IMP: NORMAL

## 2022-01-12 LAB
BACTERIA SPEC CULT: NORMAL
BACTERIA SPEC CULT: NORMAL
SERVICE CMNT-IMP: NORMAL
SERVICE CMNT-IMP: NORMAL

## 2022-01-14 ENCOUNTER — PATIENT OUTREACH (OUTPATIENT)
Dept: CASE MANAGEMENT | Age: 57
End: 2022-01-14

## 2022-01-14 NOTE — PROGRESS NOTES
Patient resolved from [address] Transitions episode on 1/14/2022. Patient states she is improving still have some congestion in her chest. Patient was able to return to chemo today. Patient currently reports that the following symptoms have improved:  no new symptoms and no worsening symptoms. No further outreach scheduled with this LPN CC. Episode of Care resolved. Patient has this LPN CC contact information if future needs arise.

## 2022-03-19 PROBLEM — C50.412 MALIGNANT NEOPLASM OF UPPER-OUTER QUADRANT OF LEFT BREAST IN FEMALE, ESTROGEN RECEPTOR POSITIVE (HCC): Status: ACTIVE | Noted: 2021-07-14

## 2022-03-19 PROBLEM — Z17.0 MALIGNANT NEOPLASM OF UPPER-OUTER QUADRANT OF LEFT BREAST IN FEMALE, ESTROGEN RECEPTOR POSITIVE (HCC): Status: ACTIVE | Noted: 2021-07-14

## 2022-04-01 ENCOUNTER — HOSPITAL ENCOUNTER (OUTPATIENT)
Dept: RADIATION THERAPY | Age: 57
Discharge: HOME | End: 2022-04-01
Payer: COMMERCIAL

## 2022-04-01 PROCEDURE — 99211 OFF/OP EST MAY X REQ PHY/QHP: CPT

## 2022-04-01 PROCEDURE — 99417 PROLNG OP E/M EACH 15 MIN: CPT | Performed by: RADIOLOGY

## 2022-04-01 PROCEDURE — 99215 OFFICE O/P EST HI 40 MIN: CPT | Performed by: RADIOLOGY

## 2022-04-13 ENCOUNTER — HOSPITAL ENCOUNTER (OUTPATIENT)
Dept: RADIATION THERAPY | Age: 57
Discharge: HOME | End: 2022-04-13
Payer: COMMERCIAL

## 2022-04-13 PROCEDURE — 77263 THER RADIOLOGY TX PLNG CPLX: CPT | Performed by: RADIOLOGY

## 2022-04-13 PROCEDURE — 77332 RADIATION TREATMENT AID(S): CPT

## 2022-04-13 PROCEDURE — 77290 THER RAD SIMULAJ FIELD CPLX: CPT

## 2022-04-19 ENCOUNTER — HOSPITAL ENCOUNTER (OUTPATIENT)
Dept: RADIATION THERAPY | Age: 57
Discharge: HOME | End: 2022-04-19
Payer: COMMERCIAL

## 2022-04-19 PROCEDURE — 77300 RADIATION THERAPY DOSE PLAN: CPT

## 2022-04-19 PROCEDURE — 77334 RADIATION TREATMENT AID(S): CPT

## 2022-04-19 PROCEDURE — 77295 3-D RADIOTHERAPY PLAN: CPT

## 2022-04-20 ENCOUNTER — APPOINTMENT (OUTPATIENT)
Dept: RADIATION THERAPY | Age: 57
End: 2022-04-20
Payer: COMMERCIAL

## 2022-04-21 ENCOUNTER — HOSPITAL ENCOUNTER (OUTPATIENT)
Dept: RADIATION THERAPY | Age: 57
Discharge: HOME | End: 2022-04-21
Payer: COMMERCIAL

## 2022-04-21 PROCEDURE — 77280 THER RAD SIMULAJ FIELD SMPL: CPT

## 2022-04-21 PROCEDURE — 77412 RADIATION TX DELIVERY LVL 3: CPT

## 2022-04-21 PROCEDURE — 77427 RADIATION TX MANAGEMENT X5: CPT | Performed by: RADIOLOGY

## 2022-04-21 NOTE — PROGRESS NOTES
Breast Cancer       Ms. Karen Flores is a 60year old woman who was referred for recurrent left T1aN1a ER/NJ positive, HER negative breast cancer, s/p partial mastectomy, sentinel node biopsy 8/10/21after core biopsy 6/9/21 with a personal history of left Stage IIA ([id number]) ER/NJ positive, HER negative breast cancer s/p partial mastectomy 3/23/18 by Dr. Christian Alonso. Initial cancer Oncotype was 17. Patient declined radiation. She has been taking Femara since 12/18 per Dr. Saundra Carrillo. The area of concern was identified on diagnostic imaging obtained for breast pain. She denied palpable mass. She denied nipple discharge. There is family history of breast cancer in her mother and grandmother and maternal cousin. She reports she has not had genetic testing. She is of Ashkenazi descent. Her most recent previous mammogram was 1/14/21. She has noted axillary swelling postoperatively. Aspiration performed 8/18/21, 8/23/21 and 9/1/21. She notes less swelling and discomfort this time. Dr. Saundra Carrillo has recommended [facility]. Oncotype returned 31 with 24% risk of distant recurrence with antiestrogen and approximately 15% benefit to chemotherapy. She completed ddAC-T per Dr. Saundra Carrillo. She has started radiation per Dr. gAus Mendez with plans for Piqray (alpelisib) and Faslodex after completion due to [facility] and ESR1 mutations per [place]. Breast/GYN history:    OB History    No obstetric history on file.           Past Medical History:   Diagnosis Date    Ashkenazi Jainism ancestry requiring population-specific genetic screening     Breast cancer ([address]     Left breast    Grand mal seizure ([address]     Seizures ([facility] [address]        Past Surgical History:   Procedure Laterality Date    HX BREAST BIOPSY Left 8/10/2021    LEFT PARTIAL MASTECTOMY WITH LOCALIZATION AND LEFT SENTINEL NODE BIOPSY (TAG HBV 8/4, SLNB MMC08/10) performed by Valeria Dowling MD at [address] [place] [address] HX BREAST LUMPECTOMY      Left breast       Current Outpatient Medications on File Prior to Visit   Medication Sig Dispense Refill    levETIRAcetam (Keppra) 1,000 mg tablet Take 1,000 mg by mouth two (2) times a day.  ergocalciferol (ERGOCALCIFEROL) 1,250 mcg (50,000 unit) capsule Take 50,000 Units by mouth.  letrozole (FEMARA) 2.5 mg tablet Take 2.5 mg by mouth daily. (Patient not taking: Reported on 9/1/2021)      phenytoin ER (DILANTIN ER) 100 mg ER capsule Take 200 mg by mouth two (2) times a day. (Patient not taking: Reported on 4/25/2022)       No current facility-administered medications on file prior to visit.        Allergies   Allergen Reactions    Ampicillin Hives    Azithromycin Hives    Cephalexin Hives    Ciprofloxacin Hives    Sulfamethoxazole-Trimethoprim Other (comments) and Itching       Social History     Tobacco Use    Smoking status: Never Smoker    Smokeless tobacco: Never Used   Vaping Use    Vaping Use: Never used   Substance Use Topics    Alcohol use: Not Currently    Drug use: Not Currently     Types: Marijuana, Cocaine     Comment: 1970's       Family History   Problem Relation Age of Onset    Breast Cancer Mother 58    Breast Cancer Maternal Grandmother     Breast Cancer Maternal Cousin          ROS: positives are bolded  General: fevers, chills, night sweats, fatigue, weight loss, weight gain  GI: nausea, vomiting, abdominal pain, change in bowel habits, hematochezia, melena, GERD  Integ: dermatitis, abnormal moles  HEENT: visual changes, vertigo, epistaxis, dysphagia, hoarseness  Cardiac: chest pain, palpitations, HTN, edema, varicosities  Resp: cough, shortness of breath, wheezing, hemoptysis, snoring, reactive airway disease  : hematuria, dysuria, frequency, urgency, nocturia, stress urinary incontinence   MSK: weakness, joint pain, arthritis  Endocrine:  thyroid disease, polyuria, polydipsia, polyphagia, poor wound healing, heat intolerance, cold intolerance  Lymph/Heme: anemia, bruising, history of blood transfusions  Neuro: dizziness, headache, fainting, seizures, stroke  Psych: anxiety, depression    Physical Exam:  Visit Vitals  /88 (BP 1 Location: Right arm, BP Patient Position: Sitting, BP Cuff Size: Adult)   Pulse 80   Temp 98.2 °F (36.8 °C) (Temporal)   Resp 17   Ht 5' (1.524 m)   Wt 77.6 kg (171 lb)   SpO2 99%   BMI 33.40 kg/m²       Gen:  No distress  Head: normocephalic, atraumatic  Mouth: Clear, no overt lesions, oral mucosa pink and moist  Neck: supple, no masses, no adenopathy, trachea midline  Resp: clear bilaterally  Cardio: Regular rate and rhythm  Abdomen: soft, nontender, nondistended  Extremities: warm, well-perfused  Neuro: sensation and strength grossly intact and symmetrical  Psych: alert and oriented to person, place and time  Breasts: palpation deferred, incision clean, healing well, radiation marks previously  Right: Examined in both the supine and upright positions. There was no supraclavicular, infraclavicular, or axillary lymphadenopathy. There were no dominant masses, no skin changes, no asymmetry identified   Left: Examined in both the supine and upright positions. There was no supraclavicular, infraclavicular, or axillary lymphadenopathy. There were no dominant masses, no skin changes, no asymmetry identified scar upper outer with nodularity ?hematoma vs mass vs scar tissue      Imagin21 left mammogram/ultrasound [facility])  Left 2:00 breast mass. Recommend ultrasound-guided core needle biopsy for further evaluation. Exam results were discussed with the patient. The referring clinician's office will be notified regarding the recommendations for left breast biopsy. Assessment Category 4: Suspicious     Signed By: Aym Pascual MD on 2021 2:31 PM    21 bilateral mammogram [facility])  No mammographic evidence of malignancy. Recommend annual screening mammography and clinical breast examination. A result letter will be sent to the patient.      The patient will be notified by the [facility] reminder system for scheduling of her annual screening mammogram.     Assessment Category 2: Benign     Signed By: Colleen Barrera MD on 1/14/2021 5:03 PM    Pathology:  8/10/21  A: Left breast mass, partial mastectomy:        Invasive ductal carcinoma, measuring 5 mm in greatest dimension. Invasive carcinoma present 0.5 mm from lateral/inferior margin with   extension into the deep dermis. Background breast tissue with biopsy site change and extensive sclerosing   apocrine adenosis. B: Left axillary contents, excisional biopsy:        One of ten lymph nodes positive for metastatic carcinoma (1/10). INVASIVE CARCINOMA OF THE BREAST:       Procedure: Excision (less than total mastectomy)       Specimen Laterality: Left    TUMOR       Histologic Type:  Invasive carcinoma of no special type (ductal)       Overall Grade: Grade 2 (scores of 6 or 7)       Glandular (Acinar) / Tubular Differentiation: Score 2       Nuclear Pleomorphism: Score 2       Mitotic Rate: Score 2       Tumor Size: 5 mm       Ductal Carcinoma In Situ (DCIS): Not identified    Tumor Extent       Skin Invasion: Invasive carcinoma directly invades into the dermis   without skin ulceration (this does not change the T         classification)       Treatment Effect in the Breast: No definite response to presurgical         therapy in the invasive carcinoma       Treatment Effect in the Lymph Nodes: No definite response to         presurgical therapy in metastatic carcinoma    MARGINS       Invasive Carcinoma Margins: Uninvolved by invasive carcinoma           Distance from Closest Margin (Millimeters): 0.5 mm           Closest Margin(s): Inferior/Lateral    LYMPH NODES       Regional Lymph Nodes: Involved by tumor cells           Number of Lymph Nodes with Macrometastases (> 2 mm): 1           Number of Lymph Nodes with Micrometastases (> 0.2 mm to 2 mm and             / or > 200 cells): 0           Size of Largest Metastatic Deposit (Millimeters): 20 mm           Extranodal Extension: Present           Total Number of Lymph Nodes Examined: 10    PATHOLOGIC STAGE CLASSIFICATION (pTNM, AJCC 8th Edition)       TNM Descriptors: y (post-treatment)       Primary Tumor (pT): pT1a       Regional Lymph Nodes (pN): pN1a    SPECIAL STUDIES       Breast Biomarker Testing Performed on Previous Outside Biopsy:   Estrogen         Receptor (ER), Progesterone Receptor (PgR), HER2 (by         immunohistochemistry)           Estrogen Receptor (ER) Status: Positive (90%)           Progesterone Receptor (PgR) Status: Positive (10%)           HER2 (by immunohistochemistry): Equivocal (Score 2+)     6/9/21  [facility]  A) LEFT BREAST MASS, CORE BIOPSY:       - INVASIVE CARCINOMA OF NOT SPECIAL TYPE (DUCTAL, NOS). - HISTOLOGIC stGstRstAstDstEst:st st1st OF 3.       - LARGEST FOCUS IN BIOPSY:  1.5 MM (MULTIPLE FRAGMENTS POSITIVE). - ANCILLARY STUDIES:          - ESTROGEN RECEPTOR: POSITIVE (90% CELS WITH MODERATE NUCLEAR STAINING)         - PROGESTERONE RECEPTOR: POSITIVE (10% CELLS WITH MODERATE NUCLEAR STAINING)         - HER-2: EQUIVOCAL (2+). FISH TEST RESULT TO FOLLOW IN A SEPARATE REPORT       Impression:  Patient Active Problem List   Diagnosis Code    Malignant neoplasm of upper-outer quadrant of left breast in female, estrogen receptor positive ([facility] [address] C50.412, Z17.0         60year old woman who was referred for recurrent left T1aN1a ER/MI positive, HER negative breast cancer, s/p partial mastectomy, sentinel node biopsy 8/10/21after core biopsy 6/9/21 with a personal history of left Stage IIA ([id number]) ER/MI positive, HER negative breast cancer s/p partial mastectomy 3/23/18 by Dr. Guillermo Koenig. Radiation per Dr. Yvonne Saez to be followed by [facility] (alpelisib) and Faslodex per Dr. Zulay Kenney. Follow up 3 months. All questions were answered. She was asked to call with any additional questions or concerns.

## 2022-04-22 ENCOUNTER — HOSPITAL ENCOUNTER (OUTPATIENT)
Dept: RADIATION THERAPY | Age: 57
Discharge: HOME | End: 2022-04-22
Payer: COMMERCIAL

## 2022-04-22 PROCEDURE — 77387 GUIDANCE FOR RADJ TX DLVR: CPT

## 2022-04-22 PROCEDURE — 77412 RADIATION TX DELIVERY LVL 3: CPT

## 2022-04-22 PROCEDURE — G6017 INTRAFRACTION TRACK MOTION: HCPCS | Performed by: RADIOLOGY

## 2022-04-25 ENCOUNTER — OFFICE VISIT (OUTPATIENT)
Dept: SURGERY | Age: 57
End: 2022-04-25
Payer: COMMERCIAL

## 2022-04-25 ENCOUNTER — HOSPITAL ENCOUNTER (OUTPATIENT)
Dept: RADIATION THERAPY | Age: 57
Discharge: HOME | End: 2022-04-25
Payer: COMMERCIAL

## 2022-04-25 VITALS
HEART RATE: 80 BPM | WEIGHT: 171 LBS | TEMPERATURE: 98.2 F | HEIGHT: 60 IN | SYSTOLIC BLOOD PRESSURE: 126 MMHG | BODY MASS INDEX: 33.57 KG/M2 | RESPIRATION RATE: 17 BRPM | DIASTOLIC BLOOD PRESSURE: 88 MMHG | OXYGEN SATURATION: 99 %

## 2022-04-25 DIAGNOSIS — C50.412 MALIGNANT NEOPLASM OF UPPER-OUTER QUADRANT OF LEFT BREAST IN FEMALE, ESTROGEN RECEPTOR POSITIVE (HCC): Primary | ICD-10-CM

## 2022-04-25 DIAGNOSIS — Z17.0 MALIGNANT NEOPLASM OF UPPER-OUTER QUADRANT OF LEFT BREAST IN FEMALE, ESTROGEN RECEPTOR POSITIVE (HCC): Primary | ICD-10-CM

## 2022-04-25 PROCEDURE — 77412 RADIATION TX DELIVERY LVL 3: CPT

## 2022-04-25 PROCEDURE — 99213 OFFICE O/P EST LOW 20 MIN: CPT | Performed by: SURGERY

## 2022-04-25 RX ORDER — LEVETIRACETAM 1000 MG/1
1000 TABLET ORAL 2 TIMES DAILY
COMMUNITY

## 2022-04-25 NOTE — LETTER
4/25/2022 8:59 AM    Patient:  Marci Aguilar   YOB: 1965  Date of Visit: 4/25/2022      Ceci Neff MD  [address] [facility] [address]  [address] [facility] [address]  Via Fax: [phone]    Dear Ceci Neff MD,      I had the pleasure of seeing Ms. Marci Aguilar in my office today. I am including a copy of my office visit today. If you have questions, please do not hesitate to call me. I look forward to following Ms. Stanton along with you and will keep you updated as to her progress.            Sincerely,      Homa Ayala MD

## 2022-04-26 ENCOUNTER — HOSPITAL ENCOUNTER (OUTPATIENT)
Dept: RADIATION THERAPY | Age: 57
Discharge: HOME | End: 2022-04-26
Payer: COMMERCIAL

## 2022-04-26 PROCEDURE — 77387 GUIDANCE FOR RADJ TX DLVR: CPT

## 2022-04-26 PROCEDURE — G6017 INTRAFRACTION TRACK MOTION: HCPCS | Performed by: RADIOLOGY

## 2022-04-26 PROCEDURE — 77412 RADIATION TX DELIVERY LVL 3: CPT

## 2022-04-27 ENCOUNTER — HOSPITAL ENCOUNTER (OUTPATIENT)
Dept: RADIATION THERAPY | Age: 57
Discharge: HOME | End: 2022-04-27
Payer: COMMERCIAL

## 2022-04-27 PROCEDURE — G6017 INTRAFRACTION TRACK MOTION: HCPCS | Performed by: RADIOLOGY

## 2022-04-27 PROCEDURE — 77412 RADIATION TX DELIVERY LVL 3: CPT

## 2022-04-27 PROCEDURE — 77387 GUIDANCE FOR RADJ TX DLVR: CPT

## 2022-04-27 PROCEDURE — 77336 RADIATION PHYSICS CONSULT: CPT

## 2022-04-28 ENCOUNTER — HOSPITAL ENCOUNTER (OUTPATIENT)
Dept: RADIATION THERAPY | Age: 57
Discharge: HOME | End: 2022-04-28
Payer: COMMERCIAL

## 2022-04-28 PROCEDURE — 77412 RADIATION TX DELIVERY LVL 3: CPT

## 2022-04-28 PROCEDURE — 77427 RADIATION TX MANAGEMENT X5: CPT | Performed by: RADIOLOGY

## 2022-04-28 PROCEDURE — G6017 INTRAFRACTION TRACK MOTION: HCPCS | Performed by: RADIOLOGY

## 2022-04-28 PROCEDURE — 77387 GUIDANCE FOR RADJ TX DLVR: CPT

## 2022-05-02 ENCOUNTER — HOSPITAL ENCOUNTER (OUTPATIENT)
Dept: RADIATION THERAPY | Age: 57
Discharge: HOME | End: 2022-05-02
Payer: COMMERCIAL

## 2022-05-02 PROCEDURE — 77387 GUIDANCE FOR RADJ TX DLVR: CPT

## 2022-05-02 PROCEDURE — 77412 RADIATION TX DELIVERY LVL 3: CPT

## 2022-05-02 PROCEDURE — G6017 INTRAFRACTION TRACK MOTION: HCPCS | Performed by: RADIOLOGY

## 2022-05-03 ENCOUNTER — HOSPITAL ENCOUNTER (OUTPATIENT)
Dept: RADIATION THERAPY | Age: 57
Discharge: HOME | End: 2022-05-03
Payer: COMMERCIAL

## 2022-05-03 PROCEDURE — G6017 INTRAFRACTION TRACK MOTION: HCPCS | Performed by: RADIOLOGY

## 2022-05-03 PROCEDURE — 77412 RADIATION TX DELIVERY LVL 3: CPT

## 2022-05-03 PROCEDURE — 77387 GUIDANCE FOR RADJ TX DLVR: CPT

## 2022-05-04 ENCOUNTER — HOSPITAL ENCOUNTER (OUTPATIENT)
Dept: RADIATION THERAPY | Age: 57
Discharge: HOME | End: 2022-05-04
Payer: COMMERCIAL

## 2022-05-04 PROCEDURE — 77412 RADIATION TX DELIVERY LVL 3: CPT

## 2022-05-04 PROCEDURE — 77387 GUIDANCE FOR RADJ TX DLVR: CPT

## 2022-05-04 PROCEDURE — 77336 RADIATION PHYSICS CONSULT: CPT

## 2022-05-04 PROCEDURE — G6017 INTRAFRACTION TRACK MOTION: HCPCS | Performed by: RADIOLOGY

## 2022-05-05 ENCOUNTER — HOSPITAL ENCOUNTER (OUTPATIENT)
Dept: RADIATION THERAPY | Age: 57
Discharge: HOME | End: 2022-05-05
Payer: COMMERCIAL

## 2022-05-05 PROCEDURE — 77427 RADIATION TX MANAGEMENT X5: CPT | Performed by: RADIOLOGY

## 2022-05-05 PROCEDURE — 77412 RADIATION TX DELIVERY LVL 3: CPT

## 2022-05-06 ENCOUNTER — HOSPITAL ENCOUNTER (OUTPATIENT)
Dept: RADIATION THERAPY | Age: 57
Discharge: HOME | End: 2022-05-06
Payer: COMMERCIAL

## 2022-05-06 PROCEDURE — G6017 INTRAFRACTION TRACK MOTION: HCPCS | Performed by: RADIOLOGY

## 2022-05-06 PROCEDURE — 77412 RADIATION TX DELIVERY LVL 3: CPT

## 2022-05-06 PROCEDURE — 77387 GUIDANCE FOR RADJ TX DLVR: CPT

## 2022-05-09 ENCOUNTER — HOSPITAL ENCOUNTER (OUTPATIENT)
Dept: RADIATION THERAPY | Age: 57
Discharge: HOME | End: 2022-05-09
Payer: COMMERCIAL

## 2022-05-09 PROCEDURE — 77387 GUIDANCE FOR RADJ TX DLVR: CPT

## 2022-05-09 PROCEDURE — G6017 INTRAFRACTION TRACK MOTION: HCPCS | Performed by: RADIOLOGY

## 2022-05-09 PROCEDURE — 77412 RADIATION TX DELIVERY LVL 3: CPT

## 2022-05-10 ENCOUNTER — HOSPITAL ENCOUNTER (OUTPATIENT)
Dept: RADIATION THERAPY | Age: 57
Discharge: HOME | End: 2022-05-10
Payer: COMMERCIAL

## 2022-05-10 PROCEDURE — 77412 RADIATION TX DELIVERY LVL 3: CPT

## 2022-05-11 ENCOUNTER — HOSPITAL ENCOUNTER (OUTPATIENT)
Dept: RADIATION THERAPY | Age: 57
Discharge: HOME | End: 2022-05-11
Payer: COMMERCIAL

## 2022-05-11 PROCEDURE — 77387 GUIDANCE FOR RADJ TX DLVR: CPT

## 2022-05-11 PROCEDURE — 77412 RADIATION TX DELIVERY LVL 3: CPT

## 2022-05-11 PROCEDURE — G6017 INTRAFRACTION TRACK MOTION: HCPCS | Performed by: RADIOLOGY

## 2022-05-11 PROCEDURE — 77336 RADIATION PHYSICS CONSULT: CPT

## 2022-05-12 ENCOUNTER — HOSPITAL ENCOUNTER (OUTPATIENT)
Dept: RADIATION THERAPY | Age: 57
Discharge: HOME | End: 2022-05-12
Payer: COMMERCIAL

## 2022-05-12 PROCEDURE — 77387 GUIDANCE FOR RADJ TX DLVR: CPT

## 2022-05-12 PROCEDURE — G6017 INTRAFRACTION TRACK MOTION: HCPCS | Performed by: RADIOLOGY

## 2022-05-12 PROCEDURE — 77412 RADIATION TX DELIVERY LVL 3: CPT

## 2022-05-12 PROCEDURE — 77427 RADIATION TX MANAGEMENT X5: CPT | Performed by: RADIOLOGY

## 2022-05-13 ENCOUNTER — HOSPITAL ENCOUNTER (OUTPATIENT)
Dept: RADIATION THERAPY | Age: 57
Discharge: HOME | End: 2022-05-13
Payer: COMMERCIAL

## 2022-05-13 PROCEDURE — 77387 GUIDANCE FOR RADJ TX DLVR: CPT

## 2022-05-13 PROCEDURE — G6017 INTRAFRACTION TRACK MOTION: HCPCS | Performed by: RADIOLOGY

## 2022-05-13 PROCEDURE — 77412 RADIATION TX DELIVERY LVL 3: CPT

## 2022-05-16 ENCOUNTER — HOSPITAL ENCOUNTER (OUTPATIENT)
Dept: RADIATION THERAPY | Age: 57
Discharge: HOME | End: 2022-05-16
Payer: COMMERCIAL

## 2022-05-16 PROCEDURE — G6017 INTRAFRACTION TRACK MOTION: HCPCS | Performed by: RADIOLOGY

## 2022-05-16 PROCEDURE — 77387 GUIDANCE FOR RADJ TX DLVR: CPT

## 2022-05-16 PROCEDURE — 77412 RADIATION TX DELIVERY LVL 3: CPT

## 2022-05-17 ENCOUNTER — HOSPITAL ENCOUNTER (OUTPATIENT)
Dept: RADIATION THERAPY | Age: 57
Discharge: HOME | End: 2022-05-17
Payer: COMMERCIAL

## 2022-05-17 PROCEDURE — 77412 RADIATION TX DELIVERY LVL 3: CPT

## 2022-05-18 ENCOUNTER — HOSPITAL ENCOUNTER (OUTPATIENT)
Dept: RADIATION THERAPY | Age: 57
Discharge: HOME | End: 2022-05-18
Payer: COMMERCIAL

## 2022-05-18 PROCEDURE — 77412 RADIATION TX DELIVERY LVL 3: CPT

## 2022-05-18 PROCEDURE — G6017 INTRAFRACTION TRACK MOTION: HCPCS | Performed by: RADIOLOGY

## 2022-05-18 PROCEDURE — 77336 RADIATION PHYSICS CONSULT: CPT

## 2022-05-18 PROCEDURE — 77387 GUIDANCE FOR RADJ TX DLVR: CPT

## 2022-05-19 ENCOUNTER — HOSPITAL ENCOUNTER (OUTPATIENT)
Dept: RADIATION THERAPY | Age: 57
Discharge: HOME | End: 2022-05-19
Payer: COMMERCIAL

## 2022-05-19 PROCEDURE — 77427 RADIATION TX MANAGEMENT X5: CPT | Performed by: RADIOLOGY

## 2022-05-19 PROCEDURE — 77412 RADIATION TX DELIVERY LVL 3: CPT

## 2022-05-20 ENCOUNTER — HOSPITAL ENCOUNTER (OUTPATIENT)
Dept: RADIATION THERAPY | Age: 57
Discharge: HOME | End: 2022-05-20
Payer: COMMERCIAL

## 2022-05-20 PROCEDURE — 77412 RADIATION TX DELIVERY LVL 3: CPT

## 2022-05-20 PROCEDURE — G6017 INTRAFRACTION TRACK MOTION: HCPCS | Performed by: RADIOLOGY

## 2022-05-20 PROCEDURE — 77387 GUIDANCE FOR RADJ TX DLVR: CPT

## 2022-05-23 ENCOUNTER — HOSPITAL ENCOUNTER (OUTPATIENT)
Dept: RADIATION THERAPY | Age: 57
Discharge: HOME | End: 2022-05-23
Payer: COMMERCIAL

## 2022-05-23 PROCEDURE — 77290 THER RAD SIMULAJ FIELD CPLX: CPT

## 2022-05-23 PROCEDURE — 77412 RADIATION TX DELIVERY LVL 3: CPT

## 2022-05-24 ENCOUNTER — HOSPITAL ENCOUNTER (OUTPATIENT)
Dept: RADIATION THERAPY | Age: 57
Discharge: HOME | End: 2022-05-24
Payer: COMMERCIAL

## 2022-05-24 PROCEDURE — 77387 GUIDANCE FOR RADJ TX DLVR: CPT

## 2022-05-24 PROCEDURE — 77412 RADIATION TX DELIVERY LVL 3: CPT

## 2022-05-24 PROCEDURE — G6017 INTRAFRACTION TRACK MOTION: HCPCS | Performed by: RADIOLOGY

## 2022-05-25 ENCOUNTER — HOSPITAL ENCOUNTER (OUTPATIENT)
Dept: RADIATION THERAPY | Age: 57
Discharge: HOME | End: 2022-05-25
Payer: COMMERCIAL

## 2022-05-25 PROCEDURE — 77336 RADIATION PHYSICS CONSULT: CPT

## 2022-05-25 PROCEDURE — 77412 RADIATION TX DELIVERY LVL 3: CPT

## 2022-05-25 PROCEDURE — 77307 TELETHX ISODOSE PLAN CPLX: CPT

## 2022-05-25 PROCEDURE — 77387 GUIDANCE FOR RADJ TX DLVR: CPT

## 2022-05-25 PROCEDURE — G6017 INTRAFRACTION TRACK MOTION: HCPCS | Performed by: RADIOLOGY

## 2022-05-25 PROCEDURE — 77334 RADIATION TREATMENT AID(S): CPT

## 2022-05-26 ENCOUNTER — HOSPITAL ENCOUNTER (OUTPATIENT)
Dept: RADIATION THERAPY | Age: 57
Discharge: HOME | End: 2022-05-26
Payer: COMMERCIAL

## 2022-05-26 PROCEDURE — 77412 RADIATION TX DELIVERY LVL 3: CPT

## 2022-05-26 PROCEDURE — 77427 RADIATION TX MANAGEMENT X5: CPT | Performed by: RADIOLOGY

## 2022-05-26 PROCEDURE — 77280 THER RAD SIMULAJ FIELD SMPL: CPT

## 2022-05-27 ENCOUNTER — HOSPITAL ENCOUNTER (OUTPATIENT)
Dept: RADIATION THERAPY | Age: 57
Discharge: HOME | End: 2022-05-27
Payer: COMMERCIAL

## 2022-05-27 PROCEDURE — 77412 RADIATION TX DELIVERY LVL 3: CPT

## 2022-05-27 PROCEDURE — G6017 INTRAFRACTION TRACK MOTION: HCPCS | Performed by: RADIOLOGY

## 2022-05-27 PROCEDURE — 77387 GUIDANCE FOR RADJ TX DLVR: CPT

## 2022-05-30 ENCOUNTER — APPOINTMENT (OUTPATIENT)
Dept: RADIATION THERAPY | Age: 57
End: 2022-05-30
Payer: COMMERCIAL

## 2022-05-31 ENCOUNTER — HOSPITAL ENCOUNTER (OUTPATIENT)
Dept: RADIATION THERAPY | Age: 57
Discharge: HOME | End: 2022-05-31
Payer: COMMERCIAL

## 2022-05-31 PROCEDURE — 77412 RADIATION TX DELIVERY LVL 3: CPT

## 2022-05-31 PROCEDURE — 77387 GUIDANCE FOR RADJ TX DLVR: CPT

## 2022-05-31 PROCEDURE — G6017 INTRAFRACTION TRACK MOTION: HCPCS | Performed by: RADIOLOGY

## 2022-06-01 ENCOUNTER — APPOINTMENT (OUTPATIENT)
Dept: RADIATION THERAPY | Age: 57
End: 2022-06-01
Payer: COMMERCIAL

## 2022-06-02 ENCOUNTER — HOSPITAL ENCOUNTER (OUTPATIENT)
Dept: RADIATION THERAPY | Age: 57
Discharge: HOME | End: 2022-06-02
Payer: COMMERCIAL

## 2022-06-02 PROCEDURE — 77412 RADIATION TX DELIVERY LVL 3: CPT

## 2022-06-02 PROCEDURE — G6017 INTRAFRACTION TRACK MOTION: HCPCS | Performed by: RADIOLOGY

## 2022-06-02 PROCEDURE — 77387 GUIDANCE FOR RADJ TX DLVR: CPT

## 2022-06-03 ENCOUNTER — HOSPITAL ENCOUNTER (OUTPATIENT)
Dept: RADIATION THERAPY | Age: 57
Discharge: HOME | End: 2022-06-03
Payer: COMMERCIAL

## 2022-06-03 PROCEDURE — 77412 RADIATION TX DELIVERY LVL 3: CPT

## 2022-06-03 PROCEDURE — 77387 GUIDANCE FOR RADJ TX DLVR: CPT

## 2022-06-03 PROCEDURE — 77336 RADIATION PHYSICS CONSULT: CPT

## 2022-06-03 PROCEDURE — G6017 INTRAFRACTION TRACK MOTION: HCPCS | Performed by: RADIOLOGY

## 2022-06-06 ENCOUNTER — HOSPITAL ENCOUNTER (OUTPATIENT)
Dept: RADIATION THERAPY | Age: 57
Discharge: HOME | End: 2022-06-06
Payer: COMMERCIAL

## 2022-06-06 PROCEDURE — 77412 RADIATION TX DELIVERY LVL 3: CPT

## 2022-06-06 PROCEDURE — 77387 GUIDANCE FOR RADJ TX DLVR: CPT

## 2022-06-06 PROCEDURE — G6017 INTRAFRACTION TRACK MOTION: HCPCS | Performed by: RADIOLOGY

## 2022-06-07 ENCOUNTER — HOSPITAL ENCOUNTER (OUTPATIENT)
Dept: RADIATION THERAPY | Age: 57
Discharge: HOME | End: 2022-06-07
Payer: COMMERCIAL

## 2022-06-07 PROCEDURE — 77412 RADIATION TX DELIVERY LVL 3: CPT

## 2022-07-21 ENCOUNTER — APPOINTMENT (OUTPATIENT)
Dept: RADIATION THERAPY | Age: 57
End: 2022-07-21

## 2022-07-28 ENCOUNTER — HOSPITAL ENCOUNTER (OUTPATIENT)
Dept: RADIATION THERAPY | Age: 57
Discharge: HOME | End: 2022-07-28
Payer: COMMERCIAL

## 2022-07-28 PROCEDURE — 99211 OFF/OP EST MAY X REQ PHY/QHP: CPT

## 2022-07-28 PROCEDURE — 99024 POSTOP FOLLOW-UP VISIT: CPT | Performed by: RADIOLOGY

## 2022-08-03 ENCOUNTER — TRANSCRIBE ORDER (OUTPATIENT)
Dept: SCHEDULING | Age: 57
End: 2022-08-03

## 2022-08-03 DIAGNOSIS — C50.412 MALIGNANT NEOPLASM OF UPPER-OUTER QUADRANT OF LEFT FEMALE BREAST (HCC): Primary | ICD-10-CM

## 2022-09-07 ENCOUNTER — HOSPITAL ENCOUNTER (OUTPATIENT)
Dept: LAB | Age: 57
Discharge: HOME | End: 2022-09-07

## 2022-09-16 NOTE — PERIOP NOTES
PRE-SURGICAL INSTRUCTIONS        Patient's Name:  Vonnie Liu      VLCQY'T Date:  9/16/2022      Surgery Date:  9/21/2022                Do NOT eat or drink anything, including candy, gum, or ice chips after midnight on 9/21/2022, unless you have specific instructions from your surgeon or anesthesia provider to do so. You may brush your teeth before coming to the hospital.  No smoking 24 hours prior to the day of surgery. No alcohol 24 hours prior to the day of surgery. No recreational drugs for one week prior to the day of surgery. Leave all valuables, including money/purse, at home. Remove all jewelry, nail polish, acrylic nails, and makeup (including mascara); no lotions powders, deodorant, or perfume/cologne/after shave on the skin. Follow instruction for Hibiclens washes and CHG wipes from surgeon's office. Glasses/contact lenses and dentures may be worn to the hospital.  They will be removed prior to surgery. Call your doctor if symptoms of a cold or illness develop within 24-48 hours prior to your surgery. 11.  If you are having an outpatient procedure, please make arrangements for a responsible ADULT TO [address] [place] [address] and stay with you for 24 hours after your surgery. 12. ONE VISITOR in the hospital at this time for outpatient procedures. Exceptions may be made for surgical admissions, per nursing unit guidelines      Special Instructions:      Bring list of CURRENT medications. Bring any pertinent legal medical records. Take these medications the morning of surgery with a sip of water:  per surgeon    Follow physician instructions about stopping anticoagulants. Complete bowel prep per MD instructions. On the day of surgery, come in the main entrance of [facility]. Let the  at the desk know you are there for surgery. A staff member will come escort you to the surgical area on the second floor.     If you have any questions or concerns, please do not hesitate to call:     (Prior to the day of surgery) [facility] department:  [phone]   (Day of surgery) Pre-Op department:  [phone]    These surgical instructions were reviewed with Sonja Nieto during the [facility] phone call.

## 2022-09-20 ENCOUNTER — ANESTHESIA EVENT (OUTPATIENT)
Dept: ENDOSCOPY | Age: 57
End: 2022-09-20
Payer: COMMERCIAL

## 2022-09-21 ENCOUNTER — HOSPITAL ENCOUNTER (OUTPATIENT)
Age: 57
Setting detail: OUTPATIENT SURGERY
Discharge: HOME | End: 2022-09-21
Attending: STUDENT IN AN ORGANIZED HEALTH CARE EDUCATION/TRAINING PROGRAM | Admitting: STUDENT IN AN ORGANIZED HEALTH CARE EDUCATION/TRAINING PROGRAM
Payer: COMMERCIAL

## 2022-09-21 ENCOUNTER — ANESTHESIA (OUTPATIENT)
Dept: ENDOSCOPY | Age: 57
End: 2022-09-21
Payer: COMMERCIAL

## 2022-09-21 VITALS
DIASTOLIC BLOOD PRESSURE: 64 MMHG | SYSTOLIC BLOOD PRESSURE: 104 MMHG | HEART RATE: 59 BPM | HEIGHT: 60 IN | BODY MASS INDEX: 33.77 KG/M2 | TEMPERATURE: 97.1 F | OXYGEN SATURATION: 100 % | RESPIRATION RATE: 16 BRPM | WEIGHT: 172 LBS

## 2022-09-21 LAB
AMPHET UR QL SCN: NEGATIVE
BARBITURATES UR QL SCN: NEGATIVE
BENZODIAZ UR QL: NEGATIVE
CANNABINOIDS UR QL SCN: NEGATIVE
COCAINE UR QL SCN: NEGATIVE
HDSCOM,HDSCOM: NORMAL
METHADONE UR QL: NEGATIVE
OPIATES UR QL: NEGATIVE
PCP UR QL: NEGATIVE

## 2022-09-21 PROCEDURE — 80307 DRUG TEST PRSMV CHEM ANLYZR: CPT

## 2022-09-21 PROCEDURE — 74011250637 HC RX REV CODE- 250/637: Performed by: STUDENT IN AN ORGANIZED HEALTH CARE EDUCATION/TRAINING PROGRAM

## 2022-09-21 PROCEDURE — 76040000019: Performed by: STUDENT IN AN ORGANIZED HEALTH CARE EDUCATION/TRAINING PROGRAM

## 2022-09-21 PROCEDURE — 74011250637 HC RX REV CODE- 250/637: Performed by: NURSE ANESTHETIST, CERTIFIED REGISTERED

## 2022-09-21 PROCEDURE — 00811 ANES LWR INTST NDSC NOS: CPT | Performed by: NURSE ANESTHETIST, CERTIFIED REGISTERED

## 2022-09-21 PROCEDURE — 74011250636 HC RX REV CODE- 250/636: Performed by: NURSE ANESTHETIST, CERTIFIED REGISTERED

## 2022-09-21 PROCEDURE — 77030021593 HC FCPS BIOP ENDOSC BSC -A: Performed by: STUDENT IN AN ORGANIZED HEALTH CARE EDUCATION/TRAINING PROGRAM

## 2022-09-21 PROCEDURE — 2709999900 HC NON-CHARGEABLE SUPPLY: Performed by: STUDENT IN AN ORGANIZED HEALTH CARE EDUCATION/TRAINING PROGRAM

## 2022-09-21 PROCEDURE — 00811 ANES LWR INTST NDSC NOS: CPT | Performed by: ANESTHESIOLOGY

## 2022-09-21 PROCEDURE — 74011250636 HC RX REV CODE- 250/636: Performed by: STUDENT IN AN ORGANIZED HEALTH CARE EDUCATION/TRAINING PROGRAM

## 2022-09-21 PROCEDURE — 77030008565 HC TBNG SUC IRR ERBE -B: Performed by: STUDENT IN AN ORGANIZED HEALTH CARE EDUCATION/TRAINING PROGRAM

## 2022-09-21 PROCEDURE — 76060000031 HC ANESTHESIA FIRST 0.5 HR: Performed by: STUDENT IN AN ORGANIZED HEALTH CARE EDUCATION/TRAINING PROGRAM

## 2022-09-21 PROCEDURE — 88305 TISSUE EXAM BY PATHOLOGIST: CPT

## 2022-09-21 RX ORDER — SODIUM CHLORIDE 0.9 % (FLUSH) 0.9 %
5-40 SYRINGE (ML) INJECTION AS NEEDED
Status: CANCELLED | OUTPATIENT
Start: 2022-09-21

## 2022-09-21 RX ORDER — HEPARIN SODIUM (PORCINE) LOCK FLUSH IV SOLN 100 UNIT/ML 100 UNIT/ML
500 SOLUTION INTRAVENOUS ONCE
Status: COMPLETED | OUTPATIENT
Start: 2022-09-21 | End: 2022-09-21

## 2022-09-21 RX ORDER — HYDROMORPHONE HYDROCHLORIDE 2 MG/ML
0.5 INJECTION, SOLUTION INTRAMUSCULAR; INTRAVENOUS; SUBCUTANEOUS AS NEEDED
Status: CANCELLED | OUTPATIENT
Start: 2022-09-21

## 2022-09-21 RX ORDER — MAGNESIUM SULFATE 100 %
4 CRYSTALS MISCELLANEOUS AS NEEDED
Status: CANCELLED | OUTPATIENT
Start: 2022-09-21

## 2022-09-21 RX ORDER — FAMOTIDINE 20 MG/1
20 TABLET, FILM COATED ORAL ONCE
Status: COMPLETED | OUTPATIENT
Start: 2022-09-21 | End: 2022-09-21

## 2022-09-21 RX ORDER — SODIUM CHLORIDE, SODIUM LACTATE, POTASSIUM CHLORIDE, CALCIUM CHLORIDE 600; 310; 30; 20 MG/100ML; MG/100ML; MG/100ML; MG/100ML
75 INJECTION, SOLUTION INTRAVENOUS CONTINUOUS
Status: CANCELLED | OUTPATIENT
Start: 2022-09-21 | End: 2022-09-25

## 2022-09-21 RX ORDER — PROPOFOL 10 MG/ML
INJECTION, EMULSION INTRAVENOUS AS NEEDED
Status: DISCONTINUED | OUTPATIENT
Start: 2022-09-21 | End: 2022-09-21 | Stop reason: HOSPADM

## 2022-09-21 RX ORDER — LIDOCAINE HYDROCHLORIDE 10 MG/ML
0.1 INJECTION, SOLUTION EPIDURAL; INFILTRATION; INTRACAUDAL; PERINEURAL AS NEEDED
Status: DISCONTINUED | OUTPATIENT
Start: 2022-09-21 | End: 2022-09-21 | Stop reason: HOSPADM

## 2022-09-21 RX ORDER — SODIUM CHLORIDE 0.9 % (FLUSH) 0.9 %
5-40 SYRINGE (ML) INJECTION EVERY 8 HOURS
Status: CANCELLED | OUTPATIENT
Start: 2022-09-21

## 2022-09-21 RX ORDER — DEXTROMETHORPHAN/PSEUDOEPHED 2.5-7.5/.8
DROPS ORAL AS NEEDED
Status: DISCONTINUED | OUTPATIENT
Start: 2022-09-21 | End: 2022-09-21 | Stop reason: HOSPADM

## 2022-09-21 RX ORDER — DEXTROSE MONOHYDRATE 100 MG/ML
0-250 INJECTION, SOLUTION INTRAVENOUS AS NEEDED
Status: CANCELLED | OUTPATIENT
Start: 2022-09-21

## 2022-09-21 RX ORDER — SODIUM CHLORIDE, SODIUM LACTATE, POTASSIUM CHLORIDE, CALCIUM CHLORIDE 600; 310; 30; 20 MG/100ML; MG/100ML; MG/100ML; MG/100ML
50 INJECTION, SOLUTION INTRAVENOUS CONTINUOUS
Status: DISCONTINUED | OUTPATIENT
Start: 2022-09-21 | End: 2022-09-21 | Stop reason: HOSPADM

## 2022-09-21 RX ORDER — ONDANSETRON 2 MG/ML
4 INJECTION INTRAMUSCULAR; INTRAVENOUS ONCE
Status: CANCELLED | OUTPATIENT
Start: 2022-09-21 | End: 2022-09-21

## 2022-09-21 RX ADMIN — PROPOFOL 40 MG: 10 INJECTION, EMULSION INTRAVENOUS at 11:33

## 2022-09-21 RX ADMIN — SODIUM CHLORIDE, POTASSIUM CHLORIDE, SODIUM LACTATE AND CALCIUM CHLORIDE 50 ML/HR: 600; 310; 30; 20 INJECTION, SOLUTION INTRAVENOUS at 09:12

## 2022-09-21 RX ADMIN — PROPOFOL 40 MG: 10 INJECTION, EMULSION INTRAVENOUS at 11:38

## 2022-09-21 RX ADMIN — PROPOFOL 100 MG: 10 INJECTION, EMULSION INTRAVENOUS at 11:28

## 2022-09-21 RX ADMIN — HEPARIN SODIUM (PORCINE) LOCK FLUSH IV SOLN 100 UNIT/ML 500 UNITS: 100 SOLUTION at 13:58

## 2022-09-21 RX ADMIN — FAMOTIDINE 20 MG: 20 TABLET ORAL at 08:44

## 2022-09-21 NOTE — ANESTHESIA PREPROCEDURE EVALUATION
Relevant Problems   No relevant active problems       Anesthetic History   No history of anesthetic complications            Review of Systems / Medical History  Patient summary reviewed and pertinent labs reviewed    Pulmonary  Within defined limits                 Neuro/Psych   Within defined limits  seizures: well controlled         Cardiovascular  Within defined limits                Exercise tolerance: >4 METS     GI/Hepatic/Renal  Within defined limits              Endo/Other        Cancer     Other Findings   Comments: H/O breast CA, sp chemo            Physical Exam    Airway  Mallampati: II  TM Distance: 4 - 6 cm  Neck ROM: normal range of motion   Mouth opening: Normal     Cardiovascular  Regular rate and rhythm,  S1 and S2 normal,  no murmur, click, rub, or gallop  Rhythm: regular  Rate: normal         Dental    Dentition: Lower dentition intact and Upper dentition intact     Pulmonary  Breath sounds clear to auscultation               Abdominal  GI exam deferred       Other Findings            Anesthetic Plan    ASA: 3  Anesthesia type: MAC          Induction: Intravenous  Anesthetic plan and risks discussed with: Patient

## 2022-09-21 NOTE — H&P
Date of Surgery Update:  Jonny Vaz was seen and examined. History and physical has been reviewed. The patient has been examined.  There have been no significant clinical changes since the completion of the originally dated History and Physical.    Signed By: Florecita Long MD     September 21, 2022 9:35 AM

## 2022-09-21 NOTE — PERIOP NOTES
Mediport access in right chest removed without difficulty after flushing with Saline 10cc IV & Heparin 500u IV. Patient tolerated procedure well. Band aid applied to removal site. No distress noted.

## 2022-09-21 NOTE — ANESTHESIA POSTPROCEDURE EVALUATION
Procedure(s):  COLONOSCOPY with bx's. MAC    Anesthesia Post Evaluation      Multimodal analgesia: multimodal analgesia used between 6 hours prior to anesthesia start to PACU discharge  Patient location during evaluation: bedside  Patient participation: complete - patient participated  Level of consciousness: awake  Pain score: 0  Pain management: adequate  Airway patency: patent  Anesthetic complications: no  Cardiovascular status: stable  Respiratory status: acceptable  Hydration status: acceptable  Post anesthesia nausea and vomiting:  none  Final Post Anesthesia Temperature Assessment:  Normothermia (36.0-37.5 degrees C)      INITIAL Post-op Vital signs:   Vitals Value Taken Time   /47 09/21/22 1159   Temp 36.3 °C (97.3 °F) 09/21/22 1152   Pulse 61 09/21/22 1203   Resp 17 09/21/22 1203   SpO2 100 % 09/21/22 1203   Vitals shown include unvalidated device data.

## 2022-09-21 NOTE — DISCHARGE INSTRUCTIONS
Colonoscopy: What to Expect at [address] [place] [address]  After a colonoscopy, you'll stay at the clinic until you wake up. Then you can go home. But you'll need to arrange for a ride. Your doctor will tell you when you can eat and do your other usual activities. Your doctor will talk to you about when you'll need your next colonoscopy. Your doctor can help you decide how often you need to be checked. This will depend on the results of your test and your risk for colorectal cancer. After the test, you may be bloated or have gas pains. You may need to pass gas. If a biopsy was done or a polyp was removed, you may have streaks of blood in your stool (feces) for a few days. Problems such as heavy rectal bleeding may not occur until several weeks after the test. This isn't common. But it can happen after polyps are removed. This care sheet gives you a general idea about how long it will take for you to recover. But each person recovers at a different pace. Follow the steps below to get better as quickly as possible. How can you care for yourself at home? Activity    Rest when you feel tired. You can do your normal activities when it feels okay to do so. Diet    Follow your doctor's directions for eating. Unless your doctor has told you not to, drink plenty of fluids. This helps to replace the fluids that were lost during the colon prep. Do not drink alcohol. Medicines    Your doctor will tell you if and when you can restart your medicines. You will also be given instructions about taking any new medicines. If you take aspirin or some other blood thinner, ask your doctor if and when to start taking it again. Make sure that you understand exactly what your doctor wants you to do. If polyps were removed or a biopsy was done during the test, your doctor may tell you not to take aspirin or other anti-inflammatory medicines for a few days. These include ibuprofen (Advil, Motrin) and naproxen (Aleve). Other instructions    For your safety, do not drive or operate machinery until the medicine wears off and you can think clearly. Your doctor may tell you not to drive or operate machinery until the day after your test.     Do not sign legal documents or make major decisions until the medicine wears off and you can think clearly. The anesthesia can make it hard for you to fully understand what you are agreeing to. Follow-up care is a key part of your treatment and safety. Be sure to make and go to all appointments, and call your doctor if you are having problems. It's also a good idea to know your test results and keep a list of the medicines you take. When should you call for help? Call 911 anytime you think you may need emergency care. For example, call if:    You passed out (lost consciousness). You pass maroon or bloody stools. You have trouble breathing. Call your doctor now or seek immediate medical care if:    You have pain that does not get better after you take pain medicine. You are sick to your stomach or cannot drink fluids. You have new or worse belly pain. You have blood in your stools. You have a fever. You cannot pass stools or gas. Watch closely for changes in your health, and be sure to contact your doctor if you have any problems. Where can you learn more? Go to http://www.gray.com/  Enter E264 in the search box to learn more about \"Colonoscopy: What to Expect at Home. \"  Current as of: September 8, 2021               Content Version: 13.2  © 2006-2022 [employer]. Care instructions adapted under license by [employer] (which disclaims liability or warranty for this information). If you have questions about a medical condition or this instruction, always ask your healthcare professional. [place] [address] any warranty or liability for your use of this information.     DISCHARGE SUMMARY from Nurse     POST-PROCEDURE INSTRUCTIONS:    Call your Physician if you:  Observe any excess bleeding. Develop a temperature over 100.5o F. Experience abdominal, shoulder or chest pain. Notice any signs of decreased circulation or nerve impairment to an extremity such as a change in color, persistent numbness, tingling, coldness or increase in pain. Vomit blood or you have nausea and vomiting lasting longer than 4 hours. Are unable to take medications. Are unable to urinate within 8 hours after discharge following general anesthesia or intravenous sedation. For the next 24 hours after receiving general anesthesia or intravenous sedation, or while taking prescription Narcotics, limit your activities:  Do NOT drive a motor vehicle, operate hazard machinery or power tools, or perform tasks that require coordination. The medication you received during your procedure may have some effect on your mental awareness. Do NOT make important personal or business decisions. The medication you received during your procedure may have some effect on your mental awareness. Do NOT drink alcoholic beverages. These drinks do not mix well with the medications that have been given to you. Upon discharge from the hospital, you must be accompanied by a responsible adult. Resume your diet as directed by your physician. Resume medications as your physician has prescribed. Please give a list of your current medications to your Primary Care Provider. Please update this list whenever your medications are discontinued, doses are changed, or new medications (including over-the-counter products) are added. Please carry medication information at all times in case of emergency situations. These are general instructions for a healthy lifestyle:    No smoking/ No tobacco products/ Avoid exposure to second hand smoke.   Surgeon General's Warning:  Quitting smoking now greatly reduces serious risk to your health. Obesity, smoking, and a sedentary lifestyle greatly increase your risk for illness. A healthy diet, regular physical exercise & weight monitoring are important for maintaining a healthy lifestyle  You may be retaining fluid if you have a history of heart failure or if you experience any of the following symptoms:  Weight gain of 3 pounds or more overnight or 5 pounds in a week, increased swelling in our hands or feet or shortness of breath while lying flat in bed. Please call your doctor as soon as you notice any of these symptoms; do not wait until your next office visit. Recognize signs and symptoms of STROKE:  F  -  Face looks uneven  A  -  Arms unable to move or move unevenly  S  -  Speech slurred or non-existent  T  -  Time to call 911 - as soon as signs and symptoms begin - DO NOT go back to bed or wait to see If you get better - TIME IS BRAIN. Colorectal Screening  Colorectal cancer almost always develops from precancerous polyps (abnormal growths) in the colon or rectum. Screening tests can find precancerous polyps, so that they can be removed before they turn into cancer. Screening tests can also find colorectal cancer early, when treatment works best.  Speak with your physician about when you should begin screening and how often you should be tested. iGuiders Activation    Thank you for requesting access to iGuiders. Please follow the instructions below to securely access and download your online medical record. iGuiders allows you to send messages to your doctor, view your test results, renew your prescriptions, schedule appointments, and more. How Do I Sign Up? In your internet browser, go to [email or web address]. Click on the First Time User? Click Here link in the Sign In box. You will see the New Member Sign Up page. Enter your iGuiders Access Code exactly as it appears below.  You will not need to use this code after youve completed the sign-up process. If you do not sign up before the expiration date, you must request a new code. FlixChip Access Code: Activation code not generated  Current FlixChip Status: Active (This is the date your Bencht access code will )    Enter the last four digits of your Social Security Number (xxxx) and Date of Birth (mm/dd/yyyy) as indicated and click Submit. You will be taken to the next sign-up page. Create a Bencht ID. This will be your FlixChip login ID and cannot be changed, so think of one that is secure and easy to remember. Create a FlixChip password. You can change your password at any time. Enter your Password Reset Question and Answer. This can be used at a later time if you forget your password. Enter your e-mail address. You will receive e-mail notification when new information is available in [address] Click Sign Up. You can now view and download portions of your medical record. Click the "[employer]" link to download a portable copy of your medical information. Additional Information    If you have questions, please call [phone]. Remember, FlixChip is NOT to be used for urgent needs. For medical emergencies, dial 911. Educational references and/or instructions provided during this visit included:    See Attached      APPOINTMENTS:    Per MD Instruction    Discharge information has been reviewed with the patient. The patient verbalized understanding.

## 2022-09-30 NOTE — PROGRESS NOTES
Breast Cancer       Ms. Mary Kay Wiley is a 60year old woman who was referred for recurrent left T1aN1a ER/MI positive, HER negative breast cancer, s/p partial mastectomy, sentinel node biopsy 8/10/21after core biopsy 6/9/21 with a personal history of left Stage IIA ([id number]) ER/MI positive, HER negative breast cancer s/p partial mastectomy 3/23/18 by Dr. Ander Whitman. Initial cancer Oncotype was 17. Patient declined radiation. She has been taking Femara since 12/18 per Dr. Mena Arriaza. The area of concern was identified on diagnostic imaging obtained for breast pain. She denied palpable mass. She denied nipple discharge. There is family history of breast cancer in her mother and grandmother and maternal cousin. She reports she has not had genetic testing. She is of Ashkenazi descent. Her most recent previous mammogram was 1/14/21. Breast/GYN history:    OB History    No obstetric history on file. Past Medical History:   Diagnosis Date    Ashkenazi Islam ancestry requiring population-specific genetic screening     Breast cancer ([address]     Left breast    Grand mal seizure ([address]     Seizures [facility]        Past Surgical History:   Procedure Laterality Date    HX BREAST BIOPSY Left 8/10/2021    LEFT PARTIAL MASTECTOMY WITH LOCALIZATION AND LEFT SENTINEL NODE BIOPSY (TAG [facility] 8/4, [facility]08/10) performed by Nishi Gomez MD at [address] [place] [address] HX BREAST LUMPECTOMY      Left breast       Current Outpatient Medications on File Prior to Visit   Medication Sig Dispense Refill    HYDROcodone-ibuprofen (VICOPROFEN) 7.5-200 mg per tablet Take 1 Tablet by mouth every eight (8) hours as needed for Pain for up to 3 days. Max Daily Amount: 3 Tablets. 10 Tablet 0    ergocalciferol (ERGOCALCIFEROL) 1,250 mcg (50,000 unit) capsule Take 50,000 Units by mouth.  letrozole (FEMARA) 2.5 mg tablet Take 2.5 mg by mouth daily.       phenytoin ER (DILANTIN ER) 100 mg ER capsule Take 200 mg by mouth two (2) times a day.       No current facility-administered medications on file prior to visit. Allergies   Allergen Reactions    Ampicillin Hives    Azithromycin Hives    Cephalexin Hives    Ciprofloxacin Hives    Sulfamethoxazole-Trimethoprim Other (comments) and Itching       Social History     Tobacco Use    Smoking status: Never Smoker    Smokeless tobacco: Never Used   Vaping Use    Vaping Use: Never used   Substance Use Topics    Alcohol use: Not Currently    Drug use: Not Currently     Types: Marijuana, Cocaine     Comment: 1970's       Family History   Problem Relation Age of Onset    Breast Cancer Mother 58    Breast Cancer Maternal Grandmother     Breast Cancer Maternal Cousin          ROS: positives are bolded  General: fevers, chills, night sweats, fatigue, weight loss, weight gain  GI: nausea, vomiting, abdominal pain, change in bowel habits, hematochezia, melena, GERD  Integ: dermatitis, abnormal moles  HEENT: visual changes, vertigo, epistaxis, dysphagia, hoarseness  Cardiac: chest pain, palpitations, HTN, edema, varicosities  Resp: cough, shortness of breath, wheezing, hemoptysis, snoring, reactive airway disease  : hematuria, dysuria, frequency, urgency, nocturia, stress urinary incontinence   MSK: weakness, joint pain, arthritis  Endocrine:  thyroid disease, polyuria, polydipsia, polyphagia, poor wound healing, heat intolerance, cold intolerance  Lymph/Heme: anemia, bruising, history of blood transfusions  Neuro: dizziness, headache, fainting, seizures, stroke  Psych: anxiety, depression    Physical Exam:  There were no vitals taken for this visit.     Gen:  No distress  Head: normocephalic, atraumatic  Mouth: Clear, no overt lesions, oral mucosa pink and moist  Neck: supple, no masses, no adenopathy, trachea midline  Resp: clear bilaterally  Cardio: Regular rate and rhythm  Abdomen: soft, nontender, nondistended  Extremities: warm, well-perfused  Neuro: sensation and strength grossly intact and symmetrical  Psych: alert and oriented to person, place and time  Breasts: palpation deferred, incision clean, seroma axilla, previously  Right: Examined in both the supine and upright positions. There was no supraclavicular, infraclavicular, or axillary lymphadenopathy. There were no dominant masses, no skin changes, no asymmetry identified   Left: Examined in both the supine and upright positions. There was no supraclavicular, infraclavicular, or axillary lymphadenopathy. There were no dominant masses, no skin changes, no asymmetry identified scar upper outer with nodularity ?hematoma vs mass vs scar tissue      Imagin21 left mammogram/ultrasound [facility])  Left 2:00 breast mass. Recommend ultrasound-guided core needle biopsy for further evaluation. Exam results were discussed with the patient. The referring clinician's office will be notified regarding the recommendations for left breast biopsy. Assessment Category 4: Suspicious     Signed By: Jason Sosa MD on 2021 2:31 PM    21 bilateral mammogram [facility])  No mammographic evidence of malignancy. Recommend annual screening mammography and clinical breast examination. A result letter will be sent to the patient. The patient will be notified by the [facility] reminder system for scheduling of her annual screening mammogram.     Assessment Category 2: Benign     Signed By: Shonna Munguia MD on 2021 5:03 PM    Pathology:  8/10/21  A: Left breast mass, partial mastectomy:        Invasive ductal carcinoma, measuring 5 mm in greatest dimension. Invasive carcinoma present 0.5 mm from lateral/inferior margin with   extension into the deep dermis. Background breast tissue with biopsy site change and extensive sclerosing   apocrine adenosis. B: Left axillary contents, excisional biopsy:        One of ten lymph nodes positive for metastatic carcinoma (1/10).      INVASIVE CARCINOMA OF THE BREAST:       Procedure: Excision (less than total mastectomy)       Specimen Laterality: Left    TUMOR       Histologic Type:  Invasive carcinoma of no special type (ductal)       Overall Grade: Grade 2 (scores of 6 or 7)       Glandular (Acinar) / Tubular Differentiation: Score 2       Nuclear Pleomorphism: Score 2       Mitotic Rate: Score 2       Tumor Size: 5 mm       Ductal Carcinoma In Situ (DCIS): Not identified    Tumor Extent       Skin Invasion: Invasive carcinoma directly invades into the dermis   without skin ulceration (this does not change the T         classification)       Treatment Effect in the Breast: No definite response to presurgical         therapy in the invasive carcinoma       Treatment Effect in the Lymph Nodes: No definite response to         presurgical therapy in metastatic carcinoma    MARGINS       Invasive Carcinoma Margins: Uninvolved by invasive carcinoma           Distance from Closest Margin (Millimeters): 0.5 mm           Closest Margin(s): Inferior/Lateral    LYMPH NODES       Regional Lymph Nodes: Involved by tumor cells           Number of Lymph Nodes with Macrometastases (> 2 mm): 1           Number of Lymph Nodes with Micrometastases (> 0.2 mm to 2 mm and             / or > 200 cells): 0           Size of Largest Metastatic Deposit (Millimeters): 20 mm           Extranodal Extension: Present           Total Number of Lymph Nodes Examined: 10    PATHOLOGIC STAGE CLASSIFICATION (pTNM, AJCC 8th Edition)       TNM Descriptors: y (post-treatment)       Primary Tumor (pT): pT1a       Regional Lymph Nodes (pN): pN1a    SPECIAL STUDIES       Breast Biomarker Testing Performed on Previous Outside Biopsy:   Estrogen         Receptor (ER), Progesterone Receptor (PgR), HER2 (by         immunohistochemistry)           Estrogen Receptor (ER) Status: Positive (90%)           Progesterone Receptor (PgR) Status: Positive (10%)           HER2 (by immunohistochemistry): Equivocal (Score 2+)     6/9/21  A) LEFT BREAST MASS, CORE BIOPSY:       - INVASIVE CARCINOMA OF NOT SPECIAL TYPE (DUCTAL, NOS). - HISTOLOGIC rdGrdRrdArdDrdErd:rd rd3rd OF 3.       - LARGEST FOCUS IN BIOPSY:  1.5 MM (MULTIPLE FRAGMENTS POSITIVE). - ANCILLARY STUDIES:          - ESTROGEN RECEPTOR: POSITIVE (90% CELS WITH MODERATE NUCLEAR STAINING)         - PROGESTERONE RECEPTOR: POSITIVE (10% CELLS WITH MODERATE NUCLEAR STAINING)         - HER-2: EQUIVOCAL (2+). FISH TEST RESULT TO FOLLOW IN A SEPARATE REPORT       Impression:  Patient Active Problem List   Diagnosis Code    Malignant neoplasm of upper-outer quadrant of left breast in female, estrogen receptor positive ([facility] C50.412, Z17.0         60year old woman who was referred for recurrent left T1aN1a ER/CO positive, HER negative breast cancer, s/p partial mastectomy, sentinel node biopsy 8/10/21after core biopsy 6/9/21 with a personal history of left Stage IIA ([id number]) ER/CO positive, HER negative breast cancer s/p partial mastectomy 3/23/18 by Dr. Taylor Ojeda. The pathology was reviewed. Follow up with medical oncology and radiation. Follow up 2 weeks. All questions were answered. She was asked to call with any additional questions or concerns. FAMILY HISTORY:  Father  Still living? Unknown  Family history of hypertension in father, Age at diagnosis: Age Unknown  Family history of MI (myocardial infarction), Age at diagnosis: Age Unknown  Family history of stroke or transient ischemic attack in father, Age at diagnosis: Age Unknown    Mother  Still living? Unknown  Family history of cancer in mother, Age at diagnosis: Age Unknown

## 2023-01-26 ENCOUNTER — HOSPITAL ENCOUNTER (OUTPATIENT)
Dept: RADIATION THERAPY | Age: 58
Discharge: HOME | End: 2023-01-26
Payer: COMMERCIAL

## 2023-01-26 PROCEDURE — 99212 OFFICE O/P EST SF 10 MIN: CPT | Performed by: RADIOLOGY

## 2023-01-26 PROCEDURE — 99212 OFFICE O/P EST SF 10 MIN: CPT

## 2023-07-15 NOTE — PROGRESS NOTES
Trang Asencio is a 60 y.o. female (: 1965) presenting to address:    Chief Complaint   Patient presents with    Post OP Follow Up     left breast cancer/ left partial mastectomy 8/10/21/  apirations of left axillary seroma       Medication list and allergies have been reviewed with Trang Asencio and updated as of today's date. I have gone over all Medical, Surgical and Social History with Trang Asencio and updated/added the information accordingly. 1. Have you been to the ER, Urgent Care or Hospitalized since your last visit? NO      2. Have you followed up with your PCP or any other Physicians since your procedure/ last office visit? YES. Routine care. Patient states has appt with oncologist on 21. 98.6

## 2023-12-26 ENCOUNTER — HOSPITAL ENCOUNTER (OUTPATIENT)
Facility: HOSPITAL | Age: 58
Discharge: HOME | End: 2023-12-29
Attending: SURGERY
Payer: COMMERCIAL

## 2023-12-26 DIAGNOSIS — N64.4 MASTODYNIA: ICD-10-CM

## 2023-12-26 DIAGNOSIS — R59.0 LOCALIZED ENLARGED LYMPH NODES: ICD-10-CM

## 2023-12-26 DIAGNOSIS — Z85.3 PERSONAL HISTORY OF MALIGNANT NEOPLASM OF BREAST: ICD-10-CM

## 2023-12-26 PROCEDURE — G0279 TOMOSYNTHESIS, MAMMO: HCPCS

## (undated) DEVICE — SOLUTION IRRIG 1000ML H2O STRL BLT

## (undated) DEVICE — SUTURE VCRL SZ 3-0 L27IN ABSRB UD L26MM SH 1/2 CIR J416H

## (undated) DEVICE — LINER SUCT CANSTR 3000CC PLAS SFT PRE ASSEMB W/OUT TBNG W/

## (undated) DEVICE — GAUZE,SPONGE,4"X4",16PLY,STRL,LF,10/TRAY: Brand: MEDLINE

## (undated) DEVICE — ENDOSCOPY PUMP TUBING/ CAP SET: Brand: ERBE

## (undated) DEVICE — FLUFF AND POLYMER UNDERPAD,EXTRA HEAVY: Brand: WINGS

## (undated) DEVICE — SYR 20ML LL STRL LF --

## (undated) DEVICE — CATHETER SUCT TR FL TIP 14FR W/ O CTRL

## (undated) DEVICE — KIT CLN UP BON SECOURS MARYV

## (undated) DEVICE — SUTURE ABSORBABLE BRAIDED 2-0 CT-1 27 IN UD VICRYL J259H

## (undated) DEVICE — PREP SKN CHLRAPRP APL 26ML STR --

## (undated) DEVICE — SYR 10ML LUER LOK 1/5ML GRAD --

## (undated) DEVICE — GARMENT,MEDLINE,DVT,INT,CALF,MED, GEN2: Brand: MEDLINE

## (undated) DEVICE — SUT SLK 2-0SH 30IN BLK --

## (undated) DEVICE — MEDI-VAC NON-CONDUCTIVE SUCTION TUBING: Brand: CARDINAL HEALTH

## (undated) DEVICE — SOLUTION IV 1000ML 0.9% SOD CHL

## (undated) DEVICE — YANKAUER,SMOOTH HANDLE,HIGH CAPACITY: Brand: MEDLINE INDUSTRIES, INC.

## (undated) DEVICE — GOWN,SIRUS,POLYRNF,SETINSLV,L,20/CS: Brand: MEDLINE

## (undated) DEVICE — ADHESIVE TISS CLOSURE 22X4 CM 4 CC HI VISC EXOFIN

## (undated) DEVICE — DRAPE TWL SURG 16X26IN BLU ORB04] ALLCARE INC]

## (undated) DEVICE — CANNULA ORIG TL CLR W FOAM CUSHIONS AND 14FT SUPL TB 3 CHN

## (undated) DEVICE — REM POLYHESIVE ADULT PATIENT RETURN ELECTRODE: Brand: VALLEYLAB

## (undated) DEVICE — BLANKET WRM AD W50XL85.8IN PACU FULL BODY FORC AIR

## (undated) DEVICE — CANNULA NSL AD TBNG L14FT STD PVC O2 CRV CONN NONFLARED NSL

## (undated) DEVICE — SYRINGE MED 25GA 3ML L5/8IN SUBQ PLAS W/ DETACH NDL SFTY

## (undated) DEVICE — SYR 50ML SLIP TIP NSAF LF STRL --

## (undated) DEVICE — SHEET, DRAPE, SPLIT, STERILE: Brand: MEDLINE

## (undated) DEVICE — FORCEPS BX L240CM JAW DIA2.8MM L CAP W/ NDL MIC MESH TOOTH

## (undated) DEVICE — INTENDED FOR TISSUE SEPARATION, AND OTHER PROCEDURES THAT REQUIRE A SHARP SURGICAL BLADE TO PUNCTURE OR CUT.: Brand: BARD-PARKER ® CARBON RIB-BACK BLADES

## (undated) DEVICE — PROBE SET W/ DRP

## (undated) DEVICE — GOWN ISOL IMPERV UNIV, DISP, OPEN BACK, BLUE --

## (undated) DEVICE — PACK PROCEDURE SURG MAJ W/ BASIN LF

## (undated) DEVICE — SUTURE MCRYL SZ 4-0 L18IN ABSRB UD L19MM PS-2 3/8 CIR PRIM Y496G